# Patient Record
Sex: FEMALE | Race: OTHER | HISPANIC OR LATINO | URBAN - METROPOLITAN AREA
[De-identification: names, ages, dates, MRNs, and addresses within clinical notes are randomized per-mention and may not be internally consistent; named-entity substitution may affect disease eponyms.]

---

## 2023-11-04 ENCOUNTER — HOSPITAL ENCOUNTER (INPATIENT)
Facility: HOSPITAL | Age: 33
LOS: 3 days | Discharge: HOME | DRG: 392 | End: 2023-11-07
Attending: EMERGENCY MEDICINE | Admitting: INTERNAL MEDICINE

## 2023-11-04 DIAGNOSIS — K57.20 DIVERTICULITIS OF LARGE INTESTINE WITH ABSCESS WITHOUT BLEEDING: Primary | ICD-10-CM

## 2023-11-04 DIAGNOSIS — K57.92 DIVERTICULITIS: ICD-10-CM

## 2023-11-04 PROBLEM — N30.00 ACUTE CYSTITIS: Status: ACTIVE | Noted: 2023-11-04

## 2023-11-04 LAB
ALBUMIN SERPL BCP-MCNC: 4.4 G/DL (ref 3.5–5.2)
ALP SERPL-CCNC: 147 U/L (ref 55–135)
ALT SERPL W/O P-5'-P-CCNC: 135 U/L (ref 10–44)
ANION GAP SERPL CALC-SCNC: 15 MMOL/L (ref 8–16)
AST SERPL-CCNC: 70 U/L (ref 10–40)
B-HCG UR QL: NEGATIVE
BACTERIA #/AREA URNS HPF: ABNORMAL /HPF
BACTERIA #/AREA URNS HPF: ABNORMAL /HPF
BASOPHILS # BLD AUTO: 0.04 K/UL (ref 0–0.2)
BASOPHILS NFR BLD: 0.5 % (ref 0–1.9)
BILIRUB SERPL-MCNC: 0.6 MG/DL (ref 0.1–1)
BILIRUB UR QL STRIP: NEGATIVE
BILIRUB UR QL STRIP: NEGATIVE
BUN SERPL-MCNC: 15 MG/DL (ref 6–20)
CALCIUM SERPL-MCNC: 10 MG/DL (ref 8.7–10.5)
CHLORIDE SERPL-SCNC: 102 MMOL/L (ref 95–110)
CLARITY UR: ABNORMAL
CLARITY UR: ABNORMAL
CO2 SERPL-SCNC: 22 MMOL/L (ref 23–29)
COLOR UR: ABNORMAL
COLOR UR: YELLOW
CREAT SERPL-MCNC: 0.8 MG/DL (ref 0.5–1.4)
DIFFERENTIAL METHOD: NORMAL
EOSINOPHIL # BLD AUTO: 0.1 K/UL (ref 0–0.5)
EOSINOPHIL NFR BLD: 1.6 % (ref 0–8)
ERYTHROCYTE [DISTWIDTH] IN BLOOD BY AUTOMATED COUNT: 13.1 % (ref 11.5–14.5)
EST. GFR  (NO RACE VARIABLE): >60 ML/MIN/1.73 M^2
GLUCOSE SERPL-MCNC: 97 MG/DL (ref 70–110)
GLUCOSE UR QL STRIP: NEGATIVE
GLUCOSE UR QL STRIP: NEGATIVE
HCT VFR BLD AUTO: 44 % (ref 37–48.5)
HCV AB SERPL QL IA: NEGATIVE
HEP C VIRUS HOLD SPECIMEN: NORMAL
HGB BLD-MCNC: 14.7 G/DL (ref 12–16)
HGB UR QL STRIP: ABNORMAL
HGB UR QL STRIP: NEGATIVE
HIV 1+2 AB+HIV1 P24 AG SERPL QL IA: NEGATIVE
HYALINE CASTS #/AREA URNS LPF: 0 /LPF
HYALINE CASTS #/AREA URNS LPF: 0 /LPF
IMM GRANULOCYTES # BLD AUTO: 0.03 K/UL (ref 0–0.04)
IMM GRANULOCYTES NFR BLD AUTO: 0.4 % (ref 0–0.5)
KETONES UR QL STRIP: NEGATIVE
KETONES UR QL STRIP: NEGATIVE
LEUKOCYTE ESTERASE UR QL STRIP: ABNORMAL
LEUKOCYTE ESTERASE UR QL STRIP: ABNORMAL
LYMPHOCYTES # BLD AUTO: 2.3 K/UL (ref 1–4.8)
LYMPHOCYTES NFR BLD: 28 % (ref 18–48)
MCH RBC QN AUTO: 28.6 PG (ref 27–31)
MCHC RBC AUTO-ENTMCNC: 33.4 G/DL (ref 32–36)
MCV RBC AUTO: 86 FL (ref 82–98)
MICROSCOPIC COMMENT: ABNORMAL
MICROSCOPIC COMMENT: ABNORMAL
MONOCYTES # BLD AUTO: 0.6 K/UL (ref 0.3–1)
MONOCYTES NFR BLD: 6.8 % (ref 4–15)
NEUTROPHILS # BLD AUTO: 5.2 K/UL (ref 1.8–7.7)
NEUTROPHILS NFR BLD: 62.7 % (ref 38–73)
NITRITE UR QL STRIP: NEGATIVE
NITRITE UR QL STRIP: NEGATIVE
NRBC BLD-RTO: 0 /100 WBC
PH UR STRIP: 6 [PH] (ref 5–8)
PH UR STRIP: 6 [PH] (ref 5–8)
PLATELET # BLD AUTO: 263 K/UL (ref 150–450)
PMV BLD AUTO: 9.8 FL (ref 9.2–12.9)
POTASSIUM SERPL-SCNC: 3.9 MMOL/L (ref 3.5–5.1)
PROT SERPL-MCNC: 9.1 G/DL (ref 6–8.4)
PROT UR QL STRIP: ABNORMAL
PROT UR QL STRIP: ABNORMAL
RBC # BLD AUTO: 5.14 M/UL (ref 4–5.4)
RBC #/AREA URNS HPF: 2 /HPF (ref 0–4)
RBC #/AREA URNS HPF: 3 /HPF (ref 0–4)
SODIUM SERPL-SCNC: 139 MMOL/L (ref 136–145)
SP GR UR STRIP: 1.03 (ref 1–1.03)
SP GR UR STRIP: >1.03 (ref 1–1.03)
SQUAMOUS #/AREA URNS HPF: 24 /HPF
SQUAMOUS #/AREA URNS HPF: 64 /HPF
URN SPEC COLLECT METH UR: ABNORMAL
URN SPEC COLLECT METH UR: ABNORMAL
UROBILINOGEN UR STRIP-ACNC: NEGATIVE EU/DL
UROBILINOGEN UR STRIP-ACNC: NEGATIVE EU/DL
WBC # BLD AUTO: 8.22 K/UL (ref 3.9–12.7)
WBC #/AREA URNS HPF: 14 /HPF (ref 0–5)
WBC #/AREA URNS HPF: 3 /HPF (ref 0–5)
WBC CLUMPS URNS QL MICRO: ABNORMAL
WBC CLUMPS URNS QL MICRO: ABNORMAL

## 2023-11-04 PROCEDURE — 87086 URINE CULTURE/COLONY COUNT: CPT | Performed by: EMERGENCY MEDICINE

## 2023-11-04 PROCEDURE — 81025 URINE PREGNANCY TEST: CPT | Performed by: EMERGENCY MEDICINE

## 2023-11-04 PROCEDURE — 96375 TX/PRO/DX INJ NEW DRUG ADDON: CPT

## 2023-11-04 PROCEDURE — 99285 EMERGENCY DEPT VISIT HI MDM: CPT | Mod: 25

## 2023-11-04 PROCEDURE — 96367 TX/PROPH/DG ADDL SEQ IV INF: CPT

## 2023-11-04 PROCEDURE — 86803 HEPATITIS C AB TEST: CPT | Performed by: EMERGENCY MEDICINE

## 2023-11-04 PROCEDURE — 63600175 PHARM REV CODE 636 W HCPCS: Performed by: EMERGENCY MEDICINE

## 2023-11-04 PROCEDURE — 96365 THER/PROPH/DIAG IV INF INIT: CPT

## 2023-11-04 PROCEDURE — 99223 PR INITIAL HOSPITAL CARE,LEVL III: ICD-10-PCS | Mod: ,,, | Performed by: SURGERY

## 2023-11-04 PROCEDURE — 25000003 PHARM REV CODE 250: Performed by: INTERNAL MEDICINE

## 2023-11-04 PROCEDURE — 63600175 PHARM REV CODE 636 W HCPCS: Performed by: INTERNAL MEDICINE

## 2023-11-04 PROCEDURE — 87389 HIV-1 AG W/HIV-1&-2 AB AG IA: CPT | Performed by: EMERGENCY MEDICINE

## 2023-11-04 PROCEDURE — 85025 COMPLETE CBC W/AUTO DIFF WBC: CPT | Performed by: EMERGENCY MEDICINE

## 2023-11-04 PROCEDURE — 81000 URINALYSIS NONAUTO W/SCOPE: CPT | Performed by: EMERGENCY MEDICINE

## 2023-11-04 PROCEDURE — 99223 1ST HOSP IP/OBS HIGH 75: CPT | Mod: ,,, | Performed by: SURGERY

## 2023-11-04 PROCEDURE — 11000001 HC ACUTE MED/SURG PRIVATE ROOM

## 2023-11-04 PROCEDURE — S5010 5% DEXTROSE AND 0.45% SALINE: HCPCS | Performed by: INTERNAL MEDICINE

## 2023-11-04 PROCEDURE — 80053 COMPREHEN METABOLIC PANEL: CPT | Performed by: EMERGENCY MEDICINE

## 2023-11-04 RX ORDER — CIPROFLOXACIN 2 MG/ML
400 INJECTION, SOLUTION INTRAVENOUS
Status: DISCONTINUED | OUTPATIENT
Start: 2023-11-04 | End: 2023-11-06

## 2023-11-04 RX ORDER — ACETAMINOPHEN 325 MG/1
650 TABLET ORAL EVERY 8 HOURS PRN
Status: DISCONTINUED | OUTPATIENT
Start: 2023-11-04 | End: 2023-11-07 | Stop reason: HOSPADM

## 2023-11-04 RX ORDER — DEXTROSE MONOHYDRATE AND SODIUM CHLORIDE 5; .45 G/100ML; G/100ML
INJECTION, SOLUTION INTRAVENOUS CONTINUOUS
Status: DISCONTINUED | OUTPATIENT
Start: 2023-11-04 | End: 2023-11-07 | Stop reason: HOSPADM

## 2023-11-04 RX ORDER — MORPHINE SULFATE 4 MG/ML
4 INJECTION, SOLUTION INTRAMUSCULAR; INTRAVENOUS EVERY 4 HOURS PRN
Status: DISCONTINUED | OUTPATIENT
Start: 2023-11-04 | End: 2023-11-07 | Stop reason: HOSPADM

## 2023-11-04 RX ORDER — ONDANSETRON 2 MG/ML
4 INJECTION INTRAMUSCULAR; INTRAVENOUS EVERY 6 HOURS PRN
Status: DISCONTINUED | OUTPATIENT
Start: 2023-11-04 | End: 2023-11-07 | Stop reason: HOSPADM

## 2023-11-04 RX ORDER — MORPHINE SULFATE 4 MG/ML
2 INJECTION, SOLUTION INTRAMUSCULAR; INTRAVENOUS EVERY 4 HOURS PRN
Status: DISCONTINUED | OUTPATIENT
Start: 2023-11-04 | End: 2023-11-07 | Stop reason: HOSPADM

## 2023-11-04 RX ORDER — METRONIDAZOLE 500 MG/100ML
500 INJECTION, SOLUTION INTRAVENOUS
Status: DISCONTINUED | OUTPATIENT
Start: 2023-11-04 | End: 2023-11-06

## 2023-11-04 RX ORDER — SODIUM CHLORIDE 0.9 % (FLUSH) 0.9 %
10 SYRINGE (ML) INJECTION
Status: DISCONTINUED | OUTPATIENT
Start: 2023-11-04 | End: 2023-11-07 | Stop reason: HOSPADM

## 2023-11-04 RX ORDER — KETOROLAC TROMETHAMINE 30 MG/ML
30 INJECTION, SOLUTION INTRAMUSCULAR; INTRAVENOUS
Status: COMPLETED | OUTPATIENT
Start: 2023-11-04 | End: 2023-11-04

## 2023-11-04 RX ADMIN — KETOROLAC TROMETHAMINE 30 MG: 30 INJECTION, SOLUTION INTRAMUSCULAR; INTRAVENOUS at 03:11

## 2023-11-04 RX ADMIN — SODIUM CHLORIDE 1000 ML: 9 INJECTION, SOLUTION INTRAVENOUS at 07:11

## 2023-11-04 RX ADMIN — DEXTROSE AND SODIUM CHLORIDE: 5; 450 INJECTION, SOLUTION INTRAVENOUS at 07:11

## 2023-11-04 RX ADMIN — METRONIDAZOLE 500 MG: 5 INJECTION, SOLUTION INTRAVENOUS at 11:11

## 2023-11-04 RX ADMIN — METRONIDAZOLE 500 MG: 5 INJECTION, SOLUTION INTRAVENOUS at 03:11

## 2023-11-04 RX ADMIN — MORPHINE SULFATE 2 MG: 4 INJECTION INTRAVENOUS at 11:11

## 2023-11-04 RX ADMIN — CIPROFLOXACIN 400 MG: 2 INJECTION, SOLUTION INTRAVENOUS at 04:11

## 2023-11-04 NOTE — ED NOTES
In and out catheterization performed using sterile technique. Pt tolerated well. Urine Specimen obtained.

## 2023-11-04 NOTE — ASSESSMENT & PLAN NOTE
Acute diverticulitis with a small 1.5 cm abscess.    Recommend admission to hospital Medicine   Bowel rest  IV fluids  IV antibiotics   Conversion to oral antibiotics if patient pain improves   If the patient's pain worsens, her white count becomes elevated or she develops fevers and chills then a repeat CT scan to see if she has developed an abdominal abscess.  If she develops an abdominal abscess then CT-guided drainage.  If her condition serous you verses then exploratory laparotomy colon resection likely end colostomy.  The colostomy would reversible in the future.      This was discussed with the patient using a .      The patient should be evaluated by case management as she will need outpatient follow-up with either colorectal surgery or Gastroenterology for her diverticulitis.      Patient should be evaluated for any financial assistance that could be provided to her.

## 2023-11-04 NOTE — SUBJECTIVE & OBJECTIVE
No current facility-administered medications on file prior to encounter.     No current outpatient medications on file prior to encounter.       Review of patient's allergies indicates:  No Known Allergies    No past medical history on file.  No past surgical history on file.  Family History    None       Tobacco Use    Smoking status: Not on file    Smokeless tobacco: Not on file   Substance and Sexual Activity    Alcohol use: Not on file    Drug use: Not on file    Sexual activity: Not on file     Review of Systems   Constitutional:  Negative for appetite change, chills, fatigue, fever and unexpected weight change.   HENT:  Negative for hearing loss and rhinorrhea.    Eyes:  Negative for visual disturbance.   Respiratory:  Negative for apnea, cough, shortness of breath and wheezing.    Cardiovascular:  Negative for chest pain and palpitations.   Gastrointestinal:  Positive for abdominal pain. Negative for abdominal distention, blood in stool, constipation, diarrhea, nausea and vomiting.   Genitourinary:  Negative for dysuria, frequency and urgency.   Musculoskeletal:  Negative for arthralgias and neck pain.   Skin:  Negative for rash.   Neurological:  Negative for seizures, weakness, numbness and headaches.   Hematological:  Negative for adenopathy. Does not bruise/bleed easily.   Psychiatric/Behavioral:  Negative for hallucinations. The patient is not nervous/anxious.      Objective:     Vital Signs (Most Recent):  Temp: 98 °F (36.7 °C) (11/04/23 1309)  Pulse: 97 (11/04/23 1700)  Resp: 15 (11/04/23 1700)  BP: 119/83 (11/04/23 1700)  SpO2: 99 % (11/04/23 1700) Vital Signs (24h Range):  Temp:  [98 °F (36.7 °C)] 98 °F (36.7 °C)  Pulse:  [95-97] 97  Resp:  [15-20] 15  SpO2:  [98 %-100 %] 99 %  BP: (119-122)/(74-83) 119/83     Weight: 74.6 kg (164 lb 5.7 oz)  There is no height or weight on file to calculate BMI.     Physical Exam  Vitals and nursing note reviewed.   Constitutional:       Appearance: She is  well-developed.      Comments: Overweight   HENT:      Head: Normocephalic.   Eyes:      Pupils: Pupils are equal, round, and reactive to light.   Neck:      Thyroid: No thyromegaly.      Vascular: No JVD.      Trachea: No tracheal deviation.   Cardiovascular:      Rate and Rhythm: Normal rate and regular rhythm.      Heart sounds: Normal heart sounds.   Pulmonary:      Breath sounds: Normal breath sounds. No wheezing.   Abdominal:      General: Bowel sounds are normal. There is no distension.      Palpations: Abdomen is soft. Abdomen is not rigid. There is no mass.      Tenderness: There is no abdominal tenderness (Right lower quadrant to palpation and percussion). There is no guarding or rebound.      Comments: Somewhat obese   Vertical lower midline incision   Musculoskeletal:         General: Normal range of motion.   Lymphadenopathy:      Cervical: No cervical adenopathy.   Skin:     General: Skin is warm and dry.      Findings: No erythema or rash.   Neurological:      Mental Status: She is oriented to person, place, and time.            I have reviewed all pertinent lab results within the past 24 hours.  CBC:   Recent Labs   Lab 11/04/23  1529   WBC 8.22   RBC 5.14   HGB 14.7   HCT 44.0      MCV 86   MCH 28.6   MCHC 33.4     BMP:   Recent Labs   Lab 11/04/23  1529   GLU 97      K 3.9      CO2 22*   BUN 15   CREATININE 0.8   CALCIUM 10.0     CMP:   Recent Labs   Lab 11/04/23  1529   GLU 97   CALCIUM 10.0   ALBUMIN 4.4   PROT 9.1*      K 3.9   CO2 22*      BUN 15   CREATININE 0.8   ALKPHOS 147*   *   AST 70*   BILITOT 0.6       Significant Diagnostics:  I have reviewed all pertinent imaging results/findings within the past 24 hours.  CT scan    Reading Physician Reading Date Result Priority   MIRELLA Kimbrough Sr., MD  [phone] 11/4/2023 STAT     Narrative & Impression  EXAMINATION:  CT ABDOMEN PELVIS WITHOUT CONTRAST     CLINICAL HISTORY:  LLQ abdominal pain;      TECHNIQUE:  Standard abdomen and pelvis CT protocol without oral or IV contrast was performed     COMPARISON:  None     FINDINGS:  Finding: The size of the heart is within normal limits. The lungs are clear. There is no pneumothorax or pleural effusion.     There is diffuse fatty infiltration of the liver.  The gallbladder, pancreas, spleen, adrenals, and kidneys are normal in appearance. The ureters and the urinary bladder are normal in appearance.  There is a T-type intrauterine device in place.  The appendix is normal in appearance.  There are diverticula in the descending portion of the colon.  Anterior to the distal aspect of the descending colon is a mild amount of inflammatory changes with a small 15 mm abscess.  There is no significant amount of fluid within its abscess.  There is no free fluid within the abdomen or pelvis. There is no pneumoperitoneum.  There is a small fat filled umbilical hernia.  The width of the mouth of this hernia measures 9 mm.     Impression:     1. There are diverticula in the descending portion of the colon.  Anterior to the distal aspect of the descending colon is a mild amount of inflammatory changes with a small 15 mm abscess. There is no significant amount of fluid within its abscess.  This is characteristic of acute diverticulitis.  2. There is a small fat filled umbilical hernia.  The width of the mouth of this hernia measures 9 mm.  3. The above findings and impressions were discussed with Dr. Win via the telephone at 15:05 on 11/04/2023.  I observed these findings at 15:03 on 11/04/2023.  All CT scans at this facility use dose modulation, iterative reconstruction, and/or weight base dosing when appropriate to reduce radiation dose when appropriate to reduce radiation dose to as low as reasonably achievable.        Electronically signed by: Sp Kimbrough MD  Date:                                            11/04/2023  Time:                                            15:08

## 2023-11-04 NOTE — HOSPITAL COURSE
Patient was evaluated in the emergency room.  A CT scan without contrast showed diverticulitis with a 1.5 cm abscess.  Surgery was asked to evaluate the patient.      The patient is currently resting comfortably    11/05/2023.  Patient reports some improvement and abdominal pain.  Will continue NPO    11/6/2023: Pain continues to improve, minimal at this point.  No nausea and vomiting.  We will convert to oral antibiotics and clear liquid diet.    11/7/2023: Pain minimal to none, tolerating regular diet without nausea and vomiting. Stable for discharge on oral antibiotics and with outpatient surgical follow-up to plan colonoscopy in 6 weeks

## 2023-11-04 NOTE — ASSESSMENT & PLAN NOTE
-CT abd :CT scan of the abdomen revealed the presence of diverticula in the descending portion of the colon. Additionally, there was evidence of mild inflammatory changes and a small 15 mm abscess anterior to the distal aspect of the descending colon, characteristic of acute diverticulitis.  -NPO  -IVF  -IVAB  -Pain control PRN

## 2023-11-04 NOTE — ED PROVIDER NOTES
SCRIBE #1 NOTE: I, Juwan Trinidad, am scribing for, and in the presence of, Yeni Win MD. I have scribed the entire note.       History     Chief Complaint   Patient presents with    Abdominal Pain     Left lower quadrant pain x 3 days, + pain with urination, denies nausea or vomiting.     Review of patient's allergies indicates:  No Known Allergies      History of Present Illness     The history is limited by a language barrier. A  was used.       11/4/2023, 3:19 PM  History obtained from the patient      History of Present Illness: Anthony Ruvalcaba is a 32 y.o. female patient who presents to the Emergency Department for evaluation of LLQ abd pain which onset gradually over the past 3 days. Symptoms are constant and moderate in severity. Pt reports she cannot sleep well due to pain and that pain is exacerbated by palpation. Pt reports pain is mitigated by walking or laying down. Associated sxs include dysuria. Patient denies any nausea, vomiting, hematuria, abnormal discharge, and all other sxs at this time. No prior Tx reported. Pt reported she has an IUD. No further complaints or concerns at this time.       Arrival mode: Personal vehicle    PCP: No primary care provider on file.        Past Medical History:  No past medical history on file.    Past Surgical History:  No past surgical history on file.      Family History:  No family history on file.    Social History:  Social History     Tobacco Use    Smoking status: Not on file    Smokeless tobacco: Not on file   Substance and Sexual Activity    Alcohol use: Not on file    Drug use: Not on file    Sexual activity: Not on file        Review of Systems     Review of Systems   Gastrointestinal:  Positive for abdominal pain (LLQ). Negative for nausea and vomiting.   Genitourinary:  Positive for dysuria. Negative for hematuria and vaginal discharge.      Physical Exam     Initial Vitals [11/04/23 1309]   BP Pulse Resp Temp SpO2    122/78 97 20 98 °F (36.7 °C) 100 %      MAP       --          Physical Exam  Nursing Notes and Vital Signs Reviewed.  Constitutional: Patient is in no acute distress. Well-developed and well-nourished.  Head: Atraumatic. Normocephalic.  Eyes: PERRL. EOM intact. Conjunctivae are not pale. No scleral icterus.  ENT: Mucous membranes are moist. Oropharynx is clear and symmetric.    Neck: Supple. Full ROM. No lymphadenopathy.  Cardiovascular: Regular rate. Regular rhythm. No murmurs, rubs, or gallops. Distal pulses are 2+ and symmetric.  Pulmonary/Chest: No respiratory distress. Clear to auscultation bilaterally. No wheezing or rales.  Abdominal: Soft and non-distended.  LLQ tenderness.  No rebound, guarding, or rigidity. Good bowel sounds.  Genitourinary: No CVA tenderness  Musculoskeletal: Moves all extremities. No obvious deformities. No edema. No calf tenderness.  Skin: Warm and dry.  Neurological:  Alert, awake, and appropriate.  Normal speech.  No acute focal neurological deficits are appreciated.  Psychiatric: Normal affect. Good eye contact. Appropriate in content.     ED Course   Procedures  ED Vital Signs:  Vitals:    11/04/23 1309 11/04/23 1630   BP: 122/78 120/74   Pulse: 97 95   Resp: 20 16   Temp: 98 °F (36.7 °C)    TempSrc: Oral    SpO2: 100% 98%   Weight: 74.6 kg (164 lb 5.7 oz)        Abnormal Lab Results:  Labs Reviewed   URINALYSIS, REFLEX TO URINE CULTURE - Abnormal; Notable for the following components:       Result Value    Appearance, UA Hazy (*)     Specific Gravity, UA >1.030 (*)     Protein, UA 1+ (*)     Leukocytes, UA 2+ (*)     All other components within normal limits    Narrative:     Specimen Source->Urine   URINALYSIS MICROSCOPIC - Abnormal; Notable for the following components:    WBC Clumps, UA Occasional (*)     All other components within normal limits    Narrative:     Specimen Source->Urine   COMPREHENSIVE METABOLIC PANEL - Abnormal; Notable for the following components:    CO2 22  (*)     Total Protein 9.1 (*)     Alkaline Phosphatase 147 (*)     AST 70 (*)      (*)     All other components within normal limits   URINALYSIS, REFLEX TO URINE CULTURE - Abnormal; Notable for the following components:    Color, UA Orange (*)     Appearance, UA Hazy (*)     Protein, UA 1+ (*)     Occult Blood UA 1+ (*)     Leukocytes, UA 3+ (*)     All other components within normal limits    Narrative:     Specimen Source->Urine   URINALYSIS MICROSCOPIC - Abnormal; Notable for the following components:    WBC, UA 14 (*)     WBC Clumps, UA Many (*)     All other components within normal limits    Narrative:     Specimen Source->Urine   CULTURE, URINE   HIV 1 / 2 ANTIBODY    Narrative:     Release to patient->Immediate   HEP C VIRUS HOLD SPECIMEN    Narrative:     Release to patient->Immediate   PREGNANCY TEST, URINE RAPID    Narrative:     Specimen Source->Urine   CBC W/ AUTO DIFFERENTIAL   HEPATITIS C ANTIBODY        All Lab Results:  Results for orders placed or performed during the hospital encounter of 11/04/23   HIV 1/2 Ag/Ab (4th Gen)   Result Value Ref Range    HIV 1/2 Ag/Ab Negative Negative   HCV Virus Hold Specimen   Result Value Ref Range    HEP C Virus Hold Specimen Hold for HCV sendout    Urinalysis, Reflex to Urine Culture Urine, Clean Catch    Specimen: Urine   Result Value Ref Range    Specimen UA Urine, Clean Catch     Color, UA Yellow Yellow, Straw, Gracie    Appearance, UA Hazy (A) Clear    pH, UA 6.0 5.0 - 8.0    Specific Gravity, UA >1.030 (A) 1.005 - 1.030    Protein, UA 1+ (A) Negative    Glucose, UA Negative Negative    Ketones, UA Negative Negative    Bilirubin (UA) Negative Negative    Occult Blood UA Negative Negative    Nitrite, UA Negative Negative    Urobilinogen, UA Negative <2.0 EU/dL    Leukocytes, UA 2+ (A) Negative   Pregnancy, urine rapid (UPT)   Result Value Ref Range    Preg Test, Ur Negative    Urinalysis Microscopic   Result Value Ref Range    RBC, UA 3 0 - 4 /hpf     WBC, UA 3 0 - 5 /hpf    WBC Clumps, UA Occasional (A) None-Rare    Bacteria Rare None-Occ /hpf    Squam Epithel, UA 24 /hpf    Hyaline Casts, UA 0 0-1/lpf /lpf    Microscopic Comment SEE COMMENT    CBC auto differential   Result Value Ref Range    WBC 8.22 3.90 - 12.70 K/uL    RBC 5.14 4.00 - 5.40 M/uL    Hemoglobin 14.7 12.0 - 16.0 g/dL    Hematocrit 44.0 37.0 - 48.5 %    MCV 86 82 - 98 fL    MCH 28.6 27.0 - 31.0 pg    MCHC 33.4 32.0 - 36.0 g/dL    RDW 13.1 11.5 - 14.5 %    Platelets 263 150 - 450 K/uL    MPV 9.8 9.2 - 12.9 fL    Immature Granulocytes 0.4 0.0 - 0.5 %    Gran # (ANC) 5.2 1.8 - 7.7 K/uL    Immature Grans (Abs) 0.03 0.00 - 0.04 K/uL    Lymph # 2.3 1.0 - 4.8 K/uL    Mono # 0.6 0.3 - 1.0 K/uL    Eos # 0.1 0.0 - 0.5 K/uL    Baso # 0.04 0.00 - 0.20 K/uL    nRBC 0 0 /100 WBC    Gran % 62.7 38.0 - 73.0 %    Lymph % 28.0 18.0 - 48.0 %    Mono % 6.8 4.0 - 15.0 %    Eosinophil % 1.6 0.0 - 8.0 %    Basophil % 0.5 0.0 - 1.9 %    Differential Method Automated    Comprehensive metabolic panel   Result Value Ref Range    Sodium 139 136 - 145 mmol/L    Potassium 3.9 3.5 - 5.1 mmol/L    Chloride 102 95 - 110 mmol/L    CO2 22 (L) 23 - 29 mmol/L    Glucose 97 70 - 110 mg/dL    BUN 15 6 - 20 mg/dL    Creatinine 0.8 0.5 - 1.4 mg/dL    Calcium 10.0 8.7 - 10.5 mg/dL    Total Protein 9.1 (H) 6.0 - 8.4 g/dL    Albumin 4.4 3.5 - 5.2 g/dL    Total Bilirubin 0.6 0.1 - 1.0 mg/dL    Alkaline Phosphatase 147 (H) 55 - 135 U/L    AST 70 (H) 10 - 40 U/L     (H) 10 - 44 U/L    eGFR >60 >60 mL/min/1.73 m^2    Anion Gap 15 8 - 16 mmol/L   Urinalysis, Reflex to Urine Culture Urine, Clean Catch    Specimen: Urine   Result Value Ref Range    Specimen UA Urine, Clean Catch     Color, UA Orange (A) Yellow, Straw, Gracie    Appearance, UA Hazy (A) Clear    pH, UA 6.0 5.0 - 8.0    Specific Gravity, UA 1.030 1.005 - 1.030    Protein, UA 1+ (A) Negative    Glucose, UA Negative Negative    Ketones, UA Negative Negative    Bilirubin (UA)  Negative Negative    Occult Blood UA 1+ (A) Negative    Nitrite, UA Negative Negative    Urobilinogen, UA Negative <2.0 EU/dL    Leukocytes, UA 3+ (A) Negative   Urinalysis Microscopic   Result Value Ref Range    RBC, UA 2 0 - 4 /hpf    WBC, UA 14 (H) 0 - 5 /hpf    WBC Clumps, UA Many (A) None-Rare    Bacteria Rare None-Occ /hpf    Squam Epithel, UA 64 /hpf    Hyaline Casts, UA 0 0-1/lpf /lpf    Microscopic Comment SEE COMMENT         Imaging Results:  Imaging Results              CT Abdomen Pelvis  Without Contrast (Final result)  Result time 11/04/23 15:08:53      Final result by MIRELLA Kimbrough Sr., MD (11/04/23 15:08:53)                   Impression:      1. There are diverticula in the descending portion of the colon.  Anterior to the distal aspect of the descending colon is a mild amount of inflammatory changes with a small 15 mm abscess. There is no significant amount of fluid within its abscess.  This is characteristic of acute diverticulitis.  2. There is a small fat filled umbilical hernia.  The width of the mouth of this hernia measures 9 mm.  3. The above findings and impressions were discussed with Dr. Win via the telephone at 15:05 on 11/04/2023.  I observed these findings at 15:03 on 11/04/2023.  All CT scans at this facility use dose modulation, iterative reconstruction, and/or weight base dosing when appropriate to reduce radiation dose when appropriate to reduce radiation dose to as low as reasonably achievable.      Electronically signed by: Sp Kimbrough MD  Date:    11/04/2023  Time:    15:08               Narrative:    EXAMINATION:  CT ABDOMEN PELVIS WITHOUT CONTRAST    CLINICAL HISTORY:  LLQ abdominal pain;    TECHNIQUE:  Standard abdomen and pelvis CT protocol without oral or IV contrast was performed    COMPARISON:  None    FINDINGS:  Finding: The size of the heart is within normal limits. The lungs are clear. There is no pneumothorax or pleural effusion.    There is diffuse fatty  infiltration of the liver.  The gallbladder, pancreas, spleen, adrenals, and kidneys are normal in appearance. The ureters and the urinary bladder are normal in appearance.  There is a T-type intrauterine device in place.  The appendix is normal in appearance.  There are diverticula in the descending portion of the colon.  Anterior to the distal aspect of the descending colon is a mild amount of inflammatory changes with a small 15 mm abscess.  There is no significant amount of fluid within its abscess.  There is no free fluid within the abdomen or pelvis. There is no pneumoperitoneum.  There is a small fat filled umbilical hernia.  The width of the mouth of this hernia measures 9 mm.                                              The Emergency Provider reviewed the vital signs and test results, which are outlined above.     ED Discussion     4:23 PM: Discussed pt's case with Dr. Andujar (General Surgery) who recommends to see if Hospital Medicine would admit her for bowel rest and IV antibiotics, and he will see her in consultation.       4:24 PM: Discussed case with Dr. Primo Mercer ([facility]). Dr. Mercer agrees with current care and management of pt and accepts admission.   Admitting Service: [facility]  Admitting Physician: Dr. Mercer  Admit to: Inpatient     4:25 PM: Re-evaluated pt. I have discussed test results, shared treatment plan, and the need for admission with patient and family at bedside. Pt and family express understanding at this time and agree with all information. All questions answered. Pt and family have no further questions or concerns at this time. Pt is ready for admit.        Medical Decision Making  Kidney Stone  UTI  Diverticulitis  TOA    33 y/o  speaking female no significant pmhx, presents with 3 day hx of worsening left lower quadrant pain and dysuria, no other symptoms or complaints, significant tenderness on exam in left lower quad.  Lab work otherwise  normal, CT scan obtained and shows concerns for acute diverticulitis with small abscess, iv antibiotics ordered, surgical consult appreciated.       Amount and/or Complexity of Data Reviewed  Labs: ordered. Decision-making details documented in ED Course.  Radiology: ordered. Decision-making details documented in ED Course.    Risk  Prescription drug management.  Decision regarding hospitalization.                ED Medication(s):  Medications   metronidazole IVPB 500 mg (0 mg Intravenous Stopped 11/4/23 1632)   ciprofloxacin (CIPRO)400mg/200ml D5W IVPB 400 mg (400 mg Intravenous New Bag 11/4/23 1632)   ketorolac injection 30 mg (30 mg Intravenous Given 11/4/23 1532)       New Prescriptions    No medications on file               Scribe Attestation:   Scribe #1: I performed the above scribed service and the documentation accurately describes the services I performed. I attest to the accuracy of the note.     Attending:   Physician Attestation Statement for Scribe #1: I, Yeni Win MD, personally performed the services described in this documentation, as scribed by Juwan Trinidad, in my presence, and it is both accurate and complete.           Clinical Impression       ICD-10-CM ICD-9-CM   1. Diverticulitis of large intestine with abscess without bleeding  K57.20 562.11     569.5   2. Diverticulitis  K57.92 562.11       Disposition:   Disposition: Admitted  Condition: Fair        Yeni Win MD  11/04/23 8179

## 2023-11-04 NOTE — CONSULTS
[facility] - Emergency Dept.  General Surgery  Consult Note    Patient Name: Anthony Ruvalcaba  MRN: [id number]  Code Status: Full Code  Admission Date: 11/4/2023  Hospital Length of Stay: 0 days  Attending Physician: Primo Kennedy,*  Primary Care Provider: No primary care provider on file.    Patient information was obtained from patient and ER records.     Inpatient consult to General surgery  Consult performed by: Ebenezer Andujar MD  Consult ordered by: Yeni Win MD  Reason for consult: Diverticulitis  Assessment/Recommendations: Diverticulitis with a small 1.5 cm abscess recommend admission to Medicine   IV fluids  Bowel rest  IV antibiotics with conversion to oral antibiotics if the patient's condition improved, which is inspected.      If the patient's condition worsens repeat abdominal CT scan with oral and IV contrast.      Surgery, Louis's procedure, only if medical or non operative management fail.        Subjective:     Principal Problem: <principal problem not specified>    History of Present Illness: In the emergency room with a three-day history of left lower quadrant pain.  The pain was sharp in nature.  It was slightly better at rest but worse with movement or pushing on the area.  Was not associated with fever chills.  She reports no significant changes of bowel habits.      Patient was Kazakh speaking.  A  was available.      No current facility-administered medications on file prior to encounter.     No current outpatient medications on file prior to encounter.       Review of patient's allergies indicates:  No Known Allergies    No past medical history on file.  No past surgical history on file.  Family History    None       Tobacco Use    Smoking status: Not on file    Smokeless tobacco: Not on file   Substance and Sexual Activity    Alcohol use: Not on file    Drug use: Not on file    Sexual activity: Not on file     Review of Systems    Constitutional:  Negative for appetite change, chills, fatigue, fever and unexpected weight change.   HENT:  Negative for hearing loss and rhinorrhea.    Eyes:  Negative for visual disturbance.   Respiratory:  Negative for apnea, cough, shortness of breath and wheezing.    Cardiovascular:  Negative for chest pain and palpitations.   Gastrointestinal:  Positive for abdominal pain. Negative for abdominal distention, blood in stool, constipation, diarrhea, nausea and vomiting.   Genitourinary:  Negative for dysuria, frequency and urgency.   Musculoskeletal:  Negative for arthralgias and neck pain.   Skin:  Negative for rash.   Neurological:  Negative for seizures, weakness, numbness and headaches.   Hematological:  Negative for adenopathy. Does not bruise/bleed easily.   Psychiatric/Behavioral:  Negative for hallucinations. The patient is not nervous/anxious.      Objective:     Vital Signs (Most Recent):  Temp: 98 °F (36.7 °C) (11/04/23 1309)  Pulse: 97 (11/04/23 1700)  Resp: 15 (11/04/23 1700)  BP: 119/83 (11/04/23 1700)  SpO2: 99 % (11/04/23 1700) Vital Signs (24h Range):  Temp:  [98 °F (36.7 °C)] 98 °F (36.7 °C)  Pulse:  [95-97] 97  Resp:  [15-20] 15  SpO2:  [98 %-100 %] 99 %  BP: (119-122)/(74-83) 119/83     Weight: 74.6 kg (164 lb 5.7 oz)  There is no height or weight on file to calculate BMI.     Physical Exam  Vitals and nursing note reviewed.   Constitutional:       Appearance: She is well-developed.      Comments: Overweight   HENT:      Head: Normocephalic.   Eyes:      Pupils: Pupils are equal, round, and reactive to light.   Neck:      Thyroid: No thyromegaly.      Vascular: No JVD.      Trachea: No tracheal deviation.   Cardiovascular:      Rate and Rhythm: Normal rate and regular rhythm.      Heart sounds: Normal heart sounds.   Pulmonary:      Breath sounds: Normal breath sounds. No wheezing.   Abdominal:      General: Bowel sounds are normal. There is no distension.      Palpations: Abdomen is soft.  Abdomen is not rigid. There is no mass.      Tenderness: There is no abdominal tenderness (Right lower quadrant to palpation and percussion). There is no guarding or rebound.      Comments: Somewhat obese   Vertical lower midline incision   Musculoskeletal:         General: Normal range of motion.   Lymphadenopathy:      Cervical: No cervical adenopathy.   Skin:     General: Skin is warm and dry.      Findings: No erythema or rash.   Neurological:      Mental Status: She is oriented to person, place, and time.            I have reviewed all pertinent lab results within the past 24 hours.  CBC:   Recent Labs   Lab 11/04/23  1529   WBC 8.22   RBC 5.14   HGB 14.7   HCT 44.0      MCV 86   MCH 28.6   MCHC 33.4     BMP:   Recent Labs   Lab 11/04/23  1529   GLU 97      K 3.9      CO2 22*   BUN 15   CREATININE 0.8   CALCIUM 10.0     CMP:   Recent Labs   Lab 11/04/23  1529   GLU 97   CALCIUM 10.0   ALBUMIN 4.4   PROT 9.1*      K 3.9   CO2 22*      BUN 15   CREATININE 0.8   ALKPHOS 147*   *   AST 70*   BILITOT 0.6       Significant Diagnostics:  I have reviewed all pertinent imaging results/findings within the past 24 hours.  CT scan    Reading Physician Reading Date Result Priority   MIRELLA Kimbrough Sr., MD  [phone] 11/4/2023 STAT     Narrative & Impression  EXAMINATION:  CT ABDOMEN PELVIS WITHOUT CONTRAST     CLINICAL HISTORY:  LLQ abdominal pain;     TECHNIQUE:  Standard abdomen and pelvis CT protocol without oral or IV contrast was performed     COMPARISON:  None     FINDINGS:  Finding: The size of the heart is within normal limits. The lungs are clear. There is no pneumothorax or pleural effusion.     There is diffuse fatty infiltration of the liver.  The gallbladder, pancreas, spleen, adrenals, and kidneys are normal in appearance. The ureters and the urinary bladder are normal in appearance.  There is a T-type intrauterine device in place.  The appendix is normal in  appearance.  There are diverticula in the descending portion of the colon.  Anterior to the distal aspect of the descending colon is a mild amount of inflammatory changes with a small 15 mm abscess.  There is no significant amount of fluid within its abscess.  There is no free fluid within the abdomen or pelvis. There is no pneumoperitoneum.  There is a small fat filled umbilical hernia.  The width of the mouth of this hernia measures 9 mm.     Impression:     1. There are diverticula in the descending portion of the colon.  Anterior to the distal aspect of the descending colon is a mild amount of inflammatory changes with a small 15 mm abscess. There is no significant amount of fluid within its abscess.  This is characteristic of acute diverticulitis.  2. There is a small fat filled umbilical hernia.  The width of the mouth of this hernia measures 9 mm.  3. The above findings and impressions were discussed with Dr. Win via the telephone at 15:05 on 11/04/2023.  I observed these findings at 15:03 on 11/04/2023.  All CT scans at this facility use dose modulation, iterative reconstruction, and/or weight base dosing when appropriate to reduce radiation dose when appropriate to reduce radiation dose to as low as reasonably achievable.        Electronically signed by: Sp Kimbrough MD  Date:                                            11/04/2023  Time:                                           15:08    Assessment/Plan:     Acute cystitis  Per hospital med    Acute diverticulitis  Acute diverticulitis with a small 1.5 cm abscess.    Recommend admission to hospital Medicine   Bowel rest  IV fluids  IV antibiotics   Conversion to oral antibiotics if patient pain improves   If the patient's pain worsens, her white count becomes elevated or she develops fevers and chills then a repeat CT scan to see if she has developed an abdominal abscess.  If she develops an abdominal abscess then CT-guided drainage.  If her  condition serous you verses then exploratory laparotomy colon resection likely end colostomy.  The colostomy would reversible in the future.      This was discussed with the patient using a .      The patient should be evaluated by case management as she will need outpatient follow-up with either colorectal surgery or Gastroenterology for her diverticulitis.      Patient should be evaluated for any financial assistance that could be provided to her.            VTE Risk Mitigation (From admission, onward)         Ordered     IP VTE LOW RISK PATIENT  Once         11/04/23 1702     Place sequential compression device  Until discontinued         11/04/23 1702            A  was used    Thank you for your consult. I will follow-up with patient. Please contact us if you have any additional questions.    Ebenezer Andujar MD  General Surgery  [facility] - Emergency Dept.

## 2023-11-04 NOTE — HPI
In the emergency room with a three-day history of left lower quadrant pain.  The pain was sharp in nature.  It was slightly better at rest but worse with movement or pushing on the area.  Was not associated with fever chills.  She reports no significant changes of bowel habits.      Patient was Bengali speaking.  A  was available.

## 2023-11-04 NOTE — SUBJECTIVE & OBJECTIVE
No past medical history on file.    No past surgical history on file.    Review of patient's allergies indicates:  No Known Allergies    No current facility-administered medications on file prior to encounter.     No current outpatient medications on file prior to encounter.     Family History    None       Tobacco Use    Smoking status: Not on file    Smokeless tobacco: Not on file   Substance and Sexual Activity    Alcohol use: Not on file    Drug use: Not on file    Sexual activity: Not on file     Review of Systems   Constitutional:  Positive for activity change and fatigue.   Gastrointestinal:  Positive for abdominal pain.   All other systems reviewed and are negative.    Objective:     Vital Signs (Most Recent):  Temp: 98 °F (36.7 °C) (11/04/23 1309)  Pulse: 95 (11/04/23 1630)  Resp: 16 (11/04/23 1630)  BP: 120/74 (11/04/23 1630)  SpO2: 98 % (11/04/23 1630) Vital Signs (24h Range):  Temp:  [98 °F (36.7 °C)] 98 °F (36.7 °C)  Pulse:  [95-97] 95  Resp:  [16-20] 16  SpO2:  [98 %-100 %] 98 %  BP: (120-122)/(74-78) 120/74     Weight: 74.6 kg (164 lb 5.7 oz)  There is no height or weight on file to calculate BMI.     Physical Exam  Constitutional:       Appearance: She is obese. She is ill-appearing.   HENT:      Head: Normocephalic and atraumatic.   Eyes:      Extraocular Movements: Extraocular movements intact.      Pupils: Pupils are equal, round, and reactive to light.   Cardiovascular:      Rate and Rhythm: Normal rate and regular rhythm.      Pulses: Normal pulses.      Heart sounds: No murmur heard.  Pulmonary:      Effort: Pulmonary effort is normal. No respiratory distress.      Breath sounds: No stridor. No wheezing or rhonchi.   Abdominal:      Tenderness: There is abdominal tenderness. There is rebound. There is no guarding.   Musculoskeletal:         General: No swelling or tenderness. Normal range of motion.      Cervical back: Normal range of motion.      Right lower leg: No edema.      Left lower leg:  No edema.   Skin:     General: Skin is warm.   Neurological:      General: No focal deficit present.      Mental Status: She is alert and oriented to person, place, and time.      Cranial Nerves: No cranial nerve deficit.      Sensory: No sensory deficit.      Motor: No weakness.      Coordination: Coordination normal.              CRANIAL NERVES     CN III, IV, VI   Pupils are equal, round, and reactive to light.       Significant Labs: All pertinent labs within the past 24 hours have been reviewed.  Recent Lab Results         11/04/23  1551   11/04/23  1529   11/04/23  1339        Albumin   4.4         ALP   147         ALT   135         Anion Gap   15         Appearance, UA Hazy     Hazy       AST   70         Bacteria, UA Rare     Rare       Baso #   0.04         Basophil %   0.5         Bilirubin (UA) Negative     Negative       BILIRUBIN TOTAL   0.6  Comment: For infants and newborns, interpretation of results should be based  on gestational age, weight and in agreement with clinical  observations.    Premature Infant recommended reference ranges:  Up to 24 hours.............<8.0 mg/dL  Up to 48 hours............<12.0 mg/dL  3-5 days..................<15.0 mg/dL  6-29 days.................<15.0 mg/dL           BUN   15         Calcium   10.0         Chloride   102         CO2   22         Color, UA Orange     Yellow       Creatinine   0.8         Differential Method   Automated         eGFR   >60         Eos #   0.1         Eosinophil %   1.6         Glucose   97         Glucose, UA Negative     Negative       Gran # (ANC)   5.2         Gran %   62.7         Hematocrit   44.0         Hemoglobin   14.7         HEP C Virus Hold Specimen   Hold for HCV sendout         HIV 1/2 Ag/Ab   Negative         Hyaline Casts, UA 0     0       Immature Grans (Abs)   0.03  Comment: Mild elevation in immature granulocytes is non specific and   can be seen in a variety of conditions including stress response,   acute  inflammation, trauma and pregnancy. Correlation with other   laboratory and clinical findings is essential.           Immature Granulocytes   0.4         Ketones, UA Negative     Negative       Leukocytes, UA 3+     2+       Lymph #   2.3         Lymph %   28.0         MCH   28.6         MCHC   33.4         MCV   86         Microscopic Comment SEE COMMENT  Comment: Other formed elements not mentioned in the report are not   present in the microscopic examination.        SEE COMMENT  Comment: Other formed elements not mentioned in the report are not   present in the microscopic examination.          Mono #   0.6         Mono %   6.8         MPV   9.8         NITRITE UA Negative     Negative       nRBC   0         Occult Blood UA 1+     Negative       pH, UA 6.0     6.0       Platelet Count   263         Potassium   3.9         Preg Test, Ur     Negative       PROTEIN TOTAL   9.1         Protein, UA 1+  Comment: Recommend a 24 hour urine protein or a urine   protein/creatinine ratio if globulin induced proteinuria is  clinically suspected.       1+  Comment: Recommend a 24 hour urine protein or a urine   protein/creatinine ratio if globulin induced proteinuria is  clinically suspected.         RBC   5.14         RBC, UA 2     3       RDW   13.1         Sodium   139         Specific Gravity, UA 1.030     >1.030       Specimen UA Urine, Clean Catch     Urine, Clean Catch       Squam Epithel, UA 64     24       UROBILINOGEN UA Negative     Negative       WBC Clumps, UA Many     Occasional       WBC, UA 14     3       WBC   8.22                 Significant Imaging: I have reviewed all pertinent imaging results/findings within the past 24 hours.

## 2023-11-04 NOTE — H&P
[facility] - Emergency Dept.  [facility] Medicine  History & Physical    Patient Name: Anthony Ruvalcaba  MRN: [id number]  Patient Class: IP- Inpatient  Admission Date: 2023  Attending Physician: Primo Dow MD  Primary Care Provider: No primary care provider on file.         Patient information was obtained from patient and ER records.     Subjective:     Principal Problem:<principal problem not specified>    Chief Complaint:   Chief Complaint   Patient presents with    Abdominal Pain     Left lower quadrant pain x 3 days, + pain with urination, denies nausea or vomiting.        HPI: 32-year-old Latin female with no significant medical history presented to the emergency room (ER) with complaints of severe left lower quadrant (LLQ) pain persisting for the last three days, which had progressively worsened. She described the pain as intermittent and sharp, rating it at 10/10 in intensity. The pain did not radiate and was not associated with significant additional symptoms. The pain was alleviated when lying flat but exacerbated with movement. The patient denied any history of trauma, fever, chills, nausea, vomiting, diarrhea, constipation, or genitourinary symptoms. She had been in her usual state of health until three days ago and had a history of a previous .    During her ER course, a CT scan of the abdomen revealed the presence of diverticula in the descending portion of the colon. Additionally, there was evidence of mild inflammatory changes and a small 15 mm abscess anterior to the distal aspect of the descending colon, characteristic of acute diverticulitis. White blood cell count (WBC) and hemoglobin/hematocrit (H/H) were within normal limits. A urinalysis was consistent with a urinary tract infection (UTI).    The patient received intravenous (IV) Cipro 400 mg, IV metronidazole 500 mg, and IV Toradol 30 mg as part of her treatment plan in the ER.    ER VS :  BP Pulse Resp Temp                         SpO2  122/78 97 20 98 °F (36.7 °C) 100 %    CODE STATUS : Full CODE  Pt will admitted to inpt with a dx of complicated diverticulitis and UTI      Past medical history : None  Past surgical history : C -section   Past family H/o reviewed non pertinent     Review of patient's allergies indicates:  No Known Allergies    No current facility-administered medications on file prior to encounter.     No current outpatient medications on file prior to encounter.     Family History    None       Tobacco Use    Smoking status: Not on file    Smokeless tobacco: Not on file   Substance and Sexual Activity    Alcohol use: Not on file    Drug use: Not on file    Sexual activity: Not on file     Review of Systems   Constitutional:  Positive for activity change and fatigue.   Gastrointestinal:  Positive for abdominal pain.   All other systems reviewed and are negative.    Objective:     Vital Signs (Most Recent):  Temp: 98 °F (36.7 °C) (11/04/23 1309)  Pulse: 95 (11/04/23 1630)  Resp: 16 (11/04/23 1630)  BP: 120/74 (11/04/23 1630)  SpO2: 98 % (11/04/23 1630) Vital Signs (24h Range):  Temp:  [98 °F (36.7 °C)] 98 °F (36.7 °C)  Pulse:  [95-97] 95  Resp:  [16-20] 16  SpO2:  [98 %-100 %] 98 %  BP: (120-122)/(74-78) 120/74     Weight: 74.6 kg (164 lb 5.7 oz)  There is no height or weight on file to calculate BMI.     Physical Exam  Constitutional:       Appearance: She is obese. She is ill-appearing.   HENT:      Head: Normocephalic and atraumatic.   Eyes:      Extraocular Movements: Extraocular movements intact.      Pupils: Pupils are equal, round, and reactive to light.   Cardiovascular:      Rate and Rhythm: Normal rate and regular rhythm.      Pulses: Normal pulses.      Heart sounds: No murmur heard.  Pulmonary:      Effort: Pulmonary effort is normal. No respiratory distress.      Breath sounds: No stridor. No wheezing or rhonchi.   Abdominal:      Tenderness: There is abdominal tenderness. There is rebound. There is  no guarding.   Musculoskeletal:         General: No swelling or tenderness. Normal range of motion.      Cervical back: Normal range of motion.      Right lower leg: No edema.      Left lower leg: No edema.   Skin:     General: Skin is warm.   Neurological:      General: No focal deficit present.      Mental Status: She is alert and oriented to person, place, and time.      Cranial Nerves: No cranial nerve deficit.      Sensory: No sensory deficit.      Motor: No weakness.      Coordination: Coordination normal.              CRANIAL NERVES     CN III, IV, VI   Pupils are equal, round, and reactive to light.       Significant Labs: All pertinent labs within the past 24 hours have been reviewed.  Recent Lab Results         11/04/23  1551   11/04/23  1529   11/04/23  1339        Albumin   4.4         ALP   147         ALT   135         Anion Gap   15         Appearance, UA Hazy     Hazy       AST   70         Bacteria, UA Rare     Rare       Baso #   0.04         Basophil %   0.5         Bilirubin (UA) Negative     Negative       BILIRUBIN TOTAL   0.6  Comment: For infants and newborns, interpretation of results should be based  on gestational age, weight and in agreement with clinical  observations.    Premature Infant recommended reference ranges:  Up to 24 hours.............<8.0 mg/dL  Up to 48 hours............<12.0 mg/dL  3-5 days..................<15.0 mg/dL  6-29 days.................<15.0 mg/dL           BUN   15         Calcium   10.0         Chloride   102         CO2   22         Color, UA Orange     Yellow       Creatinine   0.8         Differential Method   Automated         eGFR   >60         Eos #   0.1         Eosinophil %   1.6         Glucose   97         Glucose, UA Negative     Negative       Gran # (ANC)   5.2         Gran %   62.7         Hematocrit   44.0         Hemoglobin   14.7         HEP C Virus Hold Specimen   Hold for HCV sendout         HIV 1/2 Ag/Ab   Negative         Hyaline Casts, UA  0     0       Immature Grans (Abs)   0.03  Comment: Mild elevation in immature granulocytes is non specific and   can be seen in a variety of conditions including stress response,   acute inflammation, trauma and pregnancy. Correlation with other   laboratory and clinical findings is essential.           Immature Granulocytes   0.4         Ketones, UA Negative     Negative       Leukocytes, UA 3+     2+       Lymph #   2.3         Lymph %   28.0         MCH   28.6         MCHC   33.4         MCV   86         Microscopic Comment SEE COMMENT  Comment: Other formed elements not mentioned in the report are not   present in the microscopic examination.        SEE COMMENT  Comment: Other formed elements not mentioned in the report are not   present in the microscopic examination.          Mono #   0.6         Mono %   6.8         MPV   9.8         NITRITE UA Negative     Negative       nRBC   0         Occult Blood UA 1+     Negative       pH, UA 6.0     6.0       Platelet Count   263         Potassium   3.9         Preg Test, Ur     Negative       PROTEIN TOTAL   9.1         Protein, UA 1+  Comment: Recommend a 24 hour urine protein or a urine   protein/creatinine ratio if globulin induced proteinuria is  clinically suspected.       1+  Comment: Recommend a 24 hour urine protein or a urine   protein/creatinine ratio if globulin induced proteinuria is  clinically suspected.         RBC   5.14         RBC, UA 2     3       RDW   13.1         Sodium   139         Specific Gravity, UA 1.030     >1.030       Specimen UA Urine, Clean Catch     Urine, Clean Catch       Squam Epithel, UA 64     24       UROBILINOGEN UA Negative     Negative       WBC Clumps, UA Many     Occasional       WBC, UA 14     3       WBC   8.22                 Significant Imaging: I have reviewed all pertinent imaging results/findings within the past 24 hours.    Assessment/Plan:     Acute cystitis  IVAB  F/U urine cx      Acute diverticulitis  -CT abd  :CT scan of the abdomen revealed the presence of diverticula in the descending portion of the colon. Additionally, there was evidence of mild inflammatory changes and a small 15 mm abscess anterior to the distal aspect of the descending colon, characteristic of acute diverticulitis.  -NPO  -IVF  -IVAB  -Pain control PRN         VTE Risk Mitigation (From admission, onward)           Ordered     IP VTE LOW RISK PATIENT  Once         11/04/23 1702     Place sequential compression device  Until discontinued         11/04/23 1702                                   Primo Dow MD  Department of Hospital Medicine  [facility] Emergency Dept.    DUE - given  Family history non-contributory to current problem   Pertinent information:

## 2023-11-04 NOTE — Clinical Note
Diagnosis: Diverticulitis [966000]   Future Attending Provider: TAYLER SHIPMAN [[id number]]   Admitting Provider:: TAYLER SHIPMAN [[id number]]

## 2023-11-04 NOTE — HPI
32-year-old Latin female with no significant medical history presented to the emergency room (ER) with complaints of severe left lower quadrant (LLQ) pain persisting for the last three days, which had progressively worsened. She described the pain as intermittent and sharp, rating it at 10/10 in intensity. The pain did not radiate and was not associated with significant additional symptoms. The pain was alleviated when lying flat but exacerbated with movement. The patient denied any history of trauma, fever, chills, nausea, vomiting, diarrhea, constipation, or genitourinary symptoms. She had been in her usual state of health until three days ago and had a history of a previous .    During her ER course, a CT scan of the abdomen revealed the presence of diverticula in the descending portion of the colon. Additionally, there was evidence of mild inflammatory changes and a small 15 mm abscess anterior to the distal aspect of the descending colon, characteristic of acute diverticulitis. White blood cell count (WBC) and hemoglobin/hematocrit (H/H) were within normal limits. A urinalysis was consistent with a urinary tract infection (UTI).    The patient received intravenous (IV) Cipro 400 mg, IV metronidazole 500 mg, and IV Toradol 30 mg as part of her treatment plan in the ER.    ER VS :  BP Pulse Resp Temp                        SpO2  122/78 97 20 98 °F (36.7 °C) 100 %    CODE STATUS : Full CODE  Pt will admitted to inpt with a dx of complicated diverticulitis and UTI

## 2023-11-05 LAB
ALBUMIN SERPL BCP-MCNC: 3.3 G/DL (ref 3.5–5.2)
ALP SERPL-CCNC: 108 U/L (ref 55–135)
ALT SERPL W/O P-5'-P-CCNC: 122 U/L (ref 10–44)
ANION GAP SERPL CALC-SCNC: 12 MMOL/L (ref 8–16)
AST SERPL-CCNC: 81 U/L (ref 10–40)
BASOPHILS # BLD AUTO: 0.03 K/UL (ref 0–0.2)
BASOPHILS NFR BLD: 0.5 % (ref 0–1.9)
BILIRUB SERPL-MCNC: 0.6 MG/DL (ref 0.1–1)
BUN SERPL-MCNC: 13 MG/DL (ref 6–20)
CALCIUM SERPL-MCNC: 8.7 MG/DL (ref 8.7–10.5)
CHLORIDE SERPL-SCNC: 106 MMOL/L (ref 95–110)
CO2 SERPL-SCNC: 22 MMOL/L (ref 23–29)
CREAT SERPL-MCNC: 0.8 MG/DL (ref 0.5–1.4)
DIFFERENTIAL METHOD: ABNORMAL
EOSINOPHIL # BLD AUTO: 0.1 K/UL (ref 0–0.5)
EOSINOPHIL NFR BLD: 2.1 % (ref 0–8)
ERYTHROCYTE [DISTWIDTH] IN BLOOD BY AUTOMATED COUNT: 13.2 % (ref 11.5–14.5)
EST. GFR  (NO RACE VARIABLE): >60 ML/MIN/1.73 M^2
GLUCOSE SERPL-MCNC: 117 MG/DL (ref 70–110)
HCT VFR BLD AUTO: 37.4 % (ref 37–48.5)
HGB BLD-MCNC: 11.9 G/DL (ref 12–16)
IMM GRANULOCYTES # BLD AUTO: 0.02 K/UL (ref 0–0.04)
IMM GRANULOCYTES NFR BLD AUTO: 0.3 % (ref 0–0.5)
LYMPHOCYTES # BLD AUTO: 1.7 K/UL (ref 1–4.8)
LYMPHOCYTES NFR BLD: 27.7 % (ref 18–48)
MAGNESIUM SERPL-MCNC: 2 MG/DL (ref 1.6–2.6)
MCH RBC QN AUTO: 27.9 PG (ref 27–31)
MCHC RBC AUTO-ENTMCNC: 31.8 G/DL (ref 32–36)
MCV RBC AUTO: 88 FL (ref 82–98)
MONOCYTES # BLD AUTO: 0.5 K/UL (ref 0.3–1)
MONOCYTES NFR BLD: 7.4 % (ref 4–15)
NEUTROPHILS # BLD AUTO: 3.8 K/UL (ref 1.8–7.7)
NEUTROPHILS NFR BLD: 62 % (ref 38–73)
NRBC BLD-RTO: 0 /100 WBC
PHOSPHATE SERPL-MCNC: 3.6 MG/DL (ref 2.7–4.5)
PLATELET # BLD AUTO: 256 K/UL (ref 150–450)
PMV BLD AUTO: 10.2 FL (ref 9.2–12.9)
POTASSIUM SERPL-SCNC: 3.7 MMOL/L (ref 3.5–5.1)
PROCALCITONIN SERPL IA-MCNC: 0.06 NG/ML
PROT SERPL-MCNC: 6.9 G/DL (ref 6–8.4)
RBC # BLD AUTO: 4.27 M/UL (ref 4–5.4)
SODIUM SERPL-SCNC: 140 MMOL/L (ref 136–145)
WBC # BLD AUTO: 6.1 K/UL (ref 3.9–12.7)

## 2023-11-05 PROCEDURE — 80053 COMPREHEN METABOLIC PANEL: CPT | Performed by: INTERNAL MEDICINE

## 2023-11-05 PROCEDURE — S5010 5% DEXTROSE AND 0.45% SALINE: HCPCS | Performed by: INTERNAL MEDICINE

## 2023-11-05 PROCEDURE — 84145 PROCALCITONIN (PCT): CPT | Performed by: INTERNAL MEDICINE

## 2023-11-05 PROCEDURE — 11000001 HC ACUTE MED/SURG PRIVATE ROOM

## 2023-11-05 PROCEDURE — 99233 PR SUBSEQUENT HOSPITAL CARE,LEVL III: ICD-10-PCS | Mod: ,,, | Performed by: SURGERY

## 2023-11-05 PROCEDURE — 84100 ASSAY OF PHOSPHORUS: CPT | Performed by: INTERNAL MEDICINE

## 2023-11-05 PROCEDURE — 63600175 PHARM REV CODE 636 W HCPCS: Performed by: EMERGENCY MEDICINE

## 2023-11-05 PROCEDURE — 99233 SBSQ HOSP IP/OBS HIGH 50: CPT | Mod: ,,, | Performed by: SURGERY

## 2023-11-05 PROCEDURE — 83735 ASSAY OF MAGNESIUM: CPT | Performed by: INTERNAL MEDICINE

## 2023-11-05 PROCEDURE — 63600175 PHARM REV CODE 636 W HCPCS: Performed by: INTERNAL MEDICINE

## 2023-11-05 PROCEDURE — 85025 COMPLETE CBC W/AUTO DIFF WBC: CPT | Performed by: INTERNAL MEDICINE

## 2023-11-05 PROCEDURE — 25000003 PHARM REV CODE 250: Performed by: INTERNAL MEDICINE

## 2023-11-05 RX ADMIN — MORPHINE SULFATE 2 MG: 4 INJECTION INTRAVENOUS at 08:11

## 2023-11-05 RX ADMIN — DEXTROSE AND SODIUM CHLORIDE: 5; 450 INJECTION, SOLUTION INTRAVENOUS at 09:11

## 2023-11-05 RX ADMIN — METRONIDAZOLE 500 MG: 5 INJECTION, SOLUTION INTRAVENOUS at 11:11

## 2023-11-05 RX ADMIN — CIPROFLOXACIN 400 MG: 2 INJECTION, SOLUTION INTRAVENOUS at 02:11

## 2023-11-05 RX ADMIN — METRONIDAZOLE 500 MG: 5 INJECTION, SOLUTION INTRAVENOUS at 08:11

## 2023-11-05 RX ADMIN — DEXTROSE AND SODIUM CHLORIDE: 5; 450 INJECTION, SOLUTION INTRAVENOUS at 08:11

## 2023-11-05 RX ADMIN — METRONIDAZOLE 500 MG: 5 INJECTION, SOLUTION INTRAVENOUS at 04:11

## 2023-11-05 NOTE — PLAN OF CARE
[facility]  Initial Discharge Assessment       Primary Care Provider: No, Primary Doctor    Admission Diagnosis: Diverticulitis [K57.92]  Diverticulitis of large intestine with abscess without bleeding [K57.20]    Admission Date: 11/4/2023  Expected Discharge Date: Per Attending     Transition of Care Barriers: Unable to afford medication, Underinsured, Social    Payor: /     No emergency contact information on file.    Discharge Plan A: Home       No Pharmacies Listed    Initial Assessment (most recent)       Adult Discharge Assessment - 11/05/23 1202          Discharge Assessment    Assessment Type Discharge Planning Assessment     Confirmed/corrected address, phone number and insurance Yes     Confirmed Demographics Correct on Facesheet     Source of Information health record     Communicated NUNO with patient/caregiver Date not available/Unable to determine     Reason For Admission diverticulitis     Do you expect to return to your current living situation? Yes     Do you have help at home or someone to help you manage your care at home? No     Home Layout Able to live on 1st floor     Equipment Currently Used at Home none     Readmission within 30 days? No     Patient currently being followed by outpatient case management? No     Do you currently have service(s) that help you manage your care at home? No     Do you take prescription medications? No     Do you have prescription coverage? No     How do you get to doctors appointments? car, drives self     Are you on dialysis? No     Do you take coumadin? No     DME Needed Upon Discharge  none     Transition of Care Barriers Unable to afford medication;Underinsured;Social     Discharge Plan A Home

## 2023-11-05 NOTE — PROGRESS NOTES
[facility] Medicine  Progress Note    Patient Name: Anthony Ruvalcaba  MRN: [id number]  Patient Class: IP- Inpatient   Admission Date: 2023  Length of Stay: 1 days  Attending Physician: Primo Kennedy,*  Primary Care Provider: Kylie, Primary Doctor        Subjective:     Principal Problem:Diverticulitis of large intestine with abscess without bleeding        HPI:  32-year-old Latin female with no significant medical history presented to the emergency room (ER) with complaints of severe left lower quadrant (LLQ) pain persisting for the last three days, which had progressively worsened. She described the pain as intermittent and sharp, rating it at 10/10 in intensity. The pain did not radiate and was not associated with significant additional symptoms. The pain was alleviated when lying flat but exacerbated with movement. The patient denied any history of trauma, fever, chills, nausea, vomiting, diarrhea, constipation, or genitourinary symptoms. She had been in her usual state of health until three days ago and had a history of a previous .    During her ER course, a CT scan of the abdomen revealed the presence of diverticula in the descending portion of the colon. Additionally, there was evidence of mild inflammatory changes and a small 15 mm abscess anterior to the distal aspect of the descending colon, characteristic of acute diverticulitis. White blood cell count (WBC) and hemoglobin/hematocrit (H/H) were within normal limits. A urinalysis was consistent with a urinary tract infection (UTI).    The patient received intravenous (IV) Cipro 400 mg, IV metronidazole 500 mg, and IV Toradol 30 mg as part of her treatment plan in the ER.    ER VS :  BP Pulse Resp Temp                        SpO2  122/78 97 20 98 °F (36.7 °C) 100 %    CODE STATUS : Full CODE  Pt will admitted to inpt with a dx of complicated diverticulitis and UTI      Overview/Hospital Course:  33 y/o latin female  admitted with a dx of acute diverticulitis with small abscess . She report feeling better  but cont with LLQ pain .       Interval History:     Review of Systems   Constitutional:  Positive for activity change and fatigue.   Gastrointestinal:  Positive for abdominal pain.   All other systems reviewed and are negative.    Objective:     Vital Signs (Most Recent):  Temp: 97 °F (36.1 °C) (11/05/23 1113)  Pulse: 74 (11/05/23 1113)  Resp: 18 (11/05/23 1113)  BP: 108/68 (11/05/23 1113)  SpO2: 97 % (11/05/23 1113) Vital Signs (24h Range):  Temp:  [96.4 °F (35.8 °C)-98 °F (36.7 °C)] 97 °F (36.1 °C)  Pulse:  [71-99] 74  Resp:  [15-18] 18  SpO2:  [96 %-100 %] 97 %  BP: ()/(54-83) 108/68     Weight: 76 kg (167 lb 8.8 oz)  Body mass index is 30.65 kg/m².    Intake/Output Summary (Last 24 hours) at 11/5/2023 1356  Last data filed at 11/5/2023 0932  Gross per 24 hour   Intake 100 ml   Output 350 ml   Net -250 ml         Physical Exam  Constitutional:       Appearance: She is obese. She is ill-appearing.   HENT:      Head: Normocephalic and atraumatic.   Eyes:      Extraocular Movements: Extraocular movements intact.      Pupils: Pupils are equal, round, and reactive to light.   Cardiovascular:      Rate and Rhythm: Normal rate and regular rhythm.      Pulses: Normal pulses.      Heart sounds: No murmur heard.  Pulmonary:      Effort: Pulmonary effort is normal. No respiratory distress.      Breath sounds: No stridor. No wheezing or rhonchi.   Abdominal:      Tenderness: There is abdominal tenderness. There is no guarding or rebound.   Musculoskeletal:         General: No swelling or tenderness. Normal range of motion.      Cervical back: Normal range of motion.      Right lower leg: No edema.      Left lower leg: No edema.   Skin:     General: Skin is warm.   Neurological:      General: No focal deficit present.      Mental Status: She is alert and oriented to person, place, and time.      Cranial Nerves: No cranial nerve  deficit.      Sensory: No sensory deficit.      Motor: No weakness.      Coordination: Coordination normal.             Significant Labs: All pertinent labs within the past 24 hours have been reviewed.  Recent Lab Results         11/05/23  0651   11/04/23  1551   11/04/23  1529        Procalcitonin 0.06  Comment: A concentration < 0.25 ng/mL represents a low risk of bacterial   infection.  Procalcitonin may not be accurate among patients with localized   infection, recent trauma or major surgery, immunosuppressed state,   invasive fungal infection, renal dysfunction. Decisions regarding   initiation or continuation of antibiotic therapy should not be based   solely on procalcitonin levels.             Albumin 3.3     4.4            147            135       Anion Gap 12     15       Appearance, UA   Hazy         AST 81     70       Bacteria, UA   Rare         Baso # 0.03     0.04       Basophil % 0.5     0.5       Bilirubin (UA)   Negative         BILIRUBIN TOTAL 0.6  Comment: For infants and newborns, interpretation of results should be based  on gestational age, weight and in agreement with clinical  observations.    Premature Infant recommended reference ranges:  Up to 24 hours.............<8.0 mg/dL  Up to 48 hours............<12.0 mg/dL  3-5 days..................<15.0 mg/dL  6-29 days.................<15.0 mg/dL       0.6  Comment: For infants and newborns, interpretation of results should be based  on gestational age, weight and in agreement with clinical  observations.    Premature Infant recommended reference ranges:  Up to 24 hours.............<8.0 mg/dL  Up to 48 hours............<12.0 mg/dL  3-5 days..................<15.0 mg/dL  6-29 days.................<15.0 mg/dL         BUN 13     15       Calcium 8.7     10.0       Chloride 106     102       CO2 22     22       Color, UA   Orange         Creatinine 0.8     0.8       Differential Method Automated     Automated       eGFR >60     >60        Eos # 0.1     0.1       Eosinophil % 2.1     1.6       Glucose 117     97       Glucose, UA   Negative         Gran # (ANC) 3.8     5.2       Gran % 62.0     62.7       Hematocrit 37.4     44.0       Hemoglobin 11.9     14.7       Hepatitis C Ab     Negative       HEP C Virus Hold Specimen     Hold for HCV sendout       HIV 1/2 Ag/Ab     Negative       Hyaline Casts, UA   0         Immature Grans (Abs) 0.02  Comment: Mild elevation in immature granulocytes is non specific and   can be seen in a variety of conditions including stress response,   acute inflammation, trauma and pregnancy. Correlation with other   laboratory and clinical findings is essential.       0.03  Comment: Mild elevation in immature granulocytes is non specific and   can be seen in a variety of conditions including stress response,   acute inflammation, trauma and pregnancy. Correlation with other   laboratory and clinical findings is essential.         Immature Granulocytes 0.3     0.4       Ketones, UA   Negative         Leukocytes, UA   3+         Lymph # 1.7     2.3       Lymph % 27.7     28.0       Magnesium  2.0           MCH 27.9     28.6       MCHC 31.8     33.4       MCV 88     86       Microscopic Comment   SEE COMMENT  Comment: Other formed elements not mentioned in the report are not   present in the microscopic examination.            Mono # 0.5     0.6       Mono % 7.4     6.8       MPV 10.2     9.8       NITRITE UA   Negative         nRBC 0     0       Occult Blood UA   1+         pH, UA   6.0         Phosphorus Level 3.6           Platelet Count 256     263       Potassium 3.7     3.9       PROTEIN TOTAL 6.9     9.1       Protein, UA   1+  Comment: Recommend a 24 hour urine protein or a urine   protein/creatinine ratio if globulin induced proteinuria is  clinically suspected.           RBC 4.27     5.14       RBC, UA   2         RDW 13.2     13.1       Sodium 140     139       Specific [place], UA   1.030         Specimen UA    Urine, Clean Catch         Squam Epithel, UA   64         UROBILINOGEN UA   Negative         WBC Clumps, UA   Many         WBC, UA   14         WBC 6.10     8.22               Significant Imaging: I have reviewed all pertinent imaging results/findings within the past 24 hours.      Assessment/Plan:      * Diverticulitis of large intestine with abscess without bleeding  -CT abd :CT scan of the abdomen revealed the presence of diverticula in the descending portion of the colon. Additionally, there was evidence of mild inflammatory changes and a small 15 mm abscess anterior to the distal aspect of the descending colon, characteristic of acute diverticulitis.  -NPO  -IVF  -IVAB  -Pain control PRN       Acute cystitis  IVAB  F/U urine cx        VTE Risk Mitigation (From admission, onward)         Ordered     IP VTE LOW RISK PATIENT  Once         11/04/23 1702     Place sequential compression device  Until discontinued         11/04/23 1702                Discharge Planning   NUNO:      Code Status: Full Code   Is the patient medically ready for discharge?:     Reason for patient still in hospital (select all that apply): Treatment  Discharge Plan A: Home                  Primo Dow MD  Department of Hospital Medicine   [facility]

## 2023-11-05 NOTE — PLAN OF CARE
Discussed poc with pt, pt verbalized understanding    Purposeful rounding every 2hours    VS wnl  Fall precautions in place, remains injury free  Pt denies c/o nausea  Pain under control with PRN meds    IVFs  Abx given as prescribed  Bed locked at lowest position  Call light within reach    Chart check complete  Will cont with POC

## 2023-11-05 NOTE — HOSPITAL COURSE
33 y/o latin female admitted with a dx of acute diverticulitis with small abscess . She report feeling better  but cont with LLQ pain.    11/6  NAEON,  used, patient c/o LLE pain, mild, improving. Started on CLD today.    11/7  Advanced to bland diet, tolearing w/o issues, +Bms, no abdominal pain  SW consulted to assist with patient to establish care with PCP and colorectal surgery f/u for outpatient colonoscopy  Plan to complete course of Augmentin outpatient  Stable for hopsital discharge

## 2023-11-05 NOTE — SUBJECTIVE & OBJECTIVE
Interval History:     Review of Systems   Constitutional:  Positive for activity change and fatigue.   Gastrointestinal:  Positive for abdominal pain.   All other systems reviewed and are negative.    Objective:     Vital Signs (Most Recent):  Temp: 97 °F (36.1 °C) (11/05/23 1113)  Pulse: 74 (11/05/23 1113)  Resp: 18 (11/05/23 1113)  BP: 108/68 (11/05/23 1113)  SpO2: 97 % (11/05/23 1113) Vital Signs (24h Range):  Temp:  [96.4 °F (35.8 °C)-98 °F (36.7 °C)] 97 °F (36.1 °C)  Pulse:  [71-99] 74  Resp:  [15-18] 18  SpO2:  [96 %-100 %] 97 %  BP: ()/(54-83) 108/68     Weight: 76 kg (167 lb 8.8 oz)  Body mass index is 30.65 kg/m².    Intake/Output Summary (Last 24 hours) at 11/5/2023 1356  Last data filed at 11/5/2023 0932  Gross per 24 hour   Intake 100 ml   Output 350 ml   Net -250 ml         Physical Exam  Constitutional:       Appearance: She is obese. She is ill-appearing.   HENT:      Head: Normocephalic and atraumatic.   Eyes:      Extraocular Movements: Extraocular movements intact.      Pupils: Pupils are equal, round, and reactive to light.   Cardiovascular:      Rate and Rhythm: Normal rate and regular rhythm.      Pulses: Normal pulses.      Heart sounds: No murmur heard.  Pulmonary:      Effort: Pulmonary effort is normal. No respiratory distress.      Breath sounds: No stridor. No wheezing or rhonchi.   Abdominal:      Tenderness: There is abdominal tenderness. There is no guarding or rebound.   Musculoskeletal:         General: No swelling or tenderness. Normal range of motion.      Cervical back: Normal range of motion.      Right lower leg: No edema.      Left lower leg: No edema.   Skin:     General: Skin is warm.   Neurological:      General: No focal deficit present.      Mental Status: She is alert and oriented to person, place, and time.      Cranial Nerves: No cranial nerve deficit.      Sensory: No sensory deficit.      Motor: No weakness.      Coordination: Coordination normal.              Significant Labs: All pertinent labs within the past 24 hours have been reviewed.  Recent Lab Results         11/05/23  0651   11/04/23  1551   11/04/23  1529        Procalcitonin 0.06  Comment: A concentration < 0.25 ng/mL represents a low risk of bacterial   infection.  Procalcitonin may not be accurate among patients with localized   infection, recent trauma or major surgery, immunosuppressed state,   invasive fungal infection, renal dysfunction. Decisions regarding   initiation or continuation of antibiotic therapy should not be based   solely on procalcitonin levels.             Albumin 3.3     4.4            147            135       Anion Gap 12     15       Appearance, UA   Hazy         AST 81     70       Bacteria, UA   Rare         Baso # 0.03     0.04       Basophil % 0.5     0.5       Bilirubin (UA)   Negative         BILIRUBIN TOTAL 0.6  Comment: For infants and newborns, interpretation of results should be based  on gestational age, weight and in agreement with clinical  observations.    Premature Infant recommended reference ranges:  Up to 24 hours.............<8.0 mg/dL  Up to 48 hours............<12.0 mg/dL  3-5 days..................<15.0 mg/dL  6-29 days.................<15.0 mg/dL       0.6  Comment: For infants and newborns, interpretation of results should be based  on gestational age, weight and in agreement with clinical  observations.    Premature Infant recommended reference ranges:  Up to 24 hours.............<8.0 mg/dL  Up to 48 hours............<12.0 mg/dL  3-5 days..................<15.0 mg/dL  6-29 days.................<15.0 mg/dL         BUN 13     15       Calcium 8.7     10.0       Chloride 106     102       CO2 22     22       Color, UA   Orange         Creatinine 0.8     0.8       Differential Method Automated     Automated       eGFR >60     >60       Eos # 0.1     0.1       Eosinophil % 2.1     1.6       Glucose 117     97       Glucose, UA   Negative          Gran # (ANC) 3.8     5.2       Gran % 62.0     62.7       Hematocrit 37.4     44.0       Hemoglobin 11.9     14.7       Hepatitis C Ab     Negative       HEP C Virus Hold Specimen     Hold for HCV sendout       HIV 1/2 Ag/Ab     Negative       Hyaline Casts, UA   0         Immature Grans (Abs) 0.02  Comment: Mild elevation in immature granulocytes is non specific and   can be seen in a variety of conditions including stress response,   acute inflammation, trauma and pregnancy. Correlation with other   laboratory and clinical findings is essential.       0.03  Comment: Mild elevation in immature granulocytes is non specific and   can be seen in a variety of conditions including stress response,   acute inflammation, trauma and pregnancy. Correlation with other   laboratory and clinical findings is essential.         Immature Granulocytes 0.3     0.4       Ketones, UA   Negative         Leukocytes, UA   3+         Lymph # 1.7     2.3       Lymph % 27.7     28.0       Magnesium  2.0           MCH 27.9     28.6       MCHC 31.8     33.4       MCV 88     86       Microscopic Comment   SEE COMMENT  Comment: Other formed elements not mentioned in the report are not   present in the microscopic examination.            Mono # 0.5     0.6       Mono % 7.4     6.8       MPV 10.2     9.8       NITRITE UA   Negative         nRBC 0     0       Occult Blood UA   1+         pH, UA   6.0         Phosphorus Level 3.6           Platelet Count 256     263       Potassium 3.7     3.9       PROTEIN TOTAL 6.9     9.1       Protein, UA   1+  Comment: Recommend a 24 hour urine protein or a urine   protein/creatinine ratio if globulin induced proteinuria is  clinically suspected.           RBC 4.27     5.14       RBC, UA   2         RDW 13.2     13.1       Sodium 140     139       Specific [place], UA   1.030         Specimen UA   Urine, Clean Catch         Squam Epithel, UA   64         UROBILINOGEN UA   Negative         WBC Clumps, UA    Many         WBC, UA   14         WBC 6.10     8.22               Significant Imaging: I have reviewed all pertinent imaging results/findings within the past 24 hours.   Unna Boot Text: An Unna boot was placed to help immobilize the limb and facilitate more rapid healing.

## 2023-11-05 NOTE — PROGRESS NOTES
[facility]  General Surgery  Progress Note    Subjective:     History of Present Illness:  In the emergency room with a three-day history of left lower quadrant pain.  The pain was sharp in nature.  It was slightly better at rest but worse with movement or pushing on the area.  Was not associated with fever chills.  She reports no significant changes of bowel habits.      Patient was Estonian speaking.  A  was available.      Post-Op Info:  * No surgery found *         Interval History:  Slightly less pain.  Afebrile continue current management    Medications:  Continuous Infusions:   dextrose 5 % and 0.45 % NaCl 125 mL/hr at 11/05/23 0802     Scheduled Meds:   ciprofloxacin (CIPRO)400mg/200ml D5W IVPB  400 mg Intravenous Q12H    metronidazole  500 mg Intravenous Q8H     PRN Meds:acetaminophen, morphine, morphine, ondansetron, sodium chloride 0.9%     Review of patient's allergies indicates:  No Known Allergies  Objective:     Vital Signs (Most Recent):  Temp: 97 °F (36.1 °C) (11/05/23 1113)  Pulse: 74 (11/05/23 1113)  Resp: 18 (11/05/23 1113)  BP: 108/68 (11/05/23 1113)  SpO2: 97 % (11/05/23 1113) Vital Signs (24h Range):  Temp:  [96.4 °F (35.8 °C)-98 °F (36.7 °C)] 97 °F (36.1 °C)  Pulse:  [71-99] 74  Resp:  [15-20] 18  SpO2:  [96 %-100 %] 97 %  BP: ()/(54-83) 108/68     Weight: 76 kg (167 lb 8.8 oz)  Body mass index is 30.65 kg/m².    Intake/Output - Last 3 Shifts         11/03 0700 11/04 0659 11/04 0700 11/05 0659 11/05 0700 11/06 0659    IV Piggyback  100     Total Intake(mL/kg)  100 (1.3)     Urine (mL/kg/hr)   350 (1.1)    Total Output   350    Net  +100 -350                    Physical Exam  Vitals reviewed.   Constitutional:       Appearance: She is well-developed. She is obese.   HENT:      Head: Normocephalic.   Eyes:      Pupils: Pupils are equal, round, and reactive to light.   Neck:      Thyroid: No thyromegaly.      Vascular: No JVD.      Trachea: No tracheal  deviation.   Cardiovascular:      Rate and Rhythm: Normal rate and regular rhythm.      Heart sounds: Normal heart sounds.   Pulmonary:      Breath sounds: Normal breath sounds. No wheezing.   Abdominal:      General: Bowel sounds are normal. There is no distension.      Palpations: Abdomen is soft. Abdomen is not rigid. There is no mass.      Tenderness: There is no abdominal tenderness (mild to moderate left lower quadrant). There is no guarding or rebound.      Comments: Obese, lower midline scar   Musculoskeletal:         General: Normal range of motion.   Lymphadenopathy:      Cervical: No cervical adenopathy.   Skin:     General: Skin is warm and dry.      Findings: No erythema or rash.   Neurological:      Mental Status: She is alert and oriented to person, place, and time.          Significant Labs:  I have reviewed all pertinent lab results within the past 24 hours.  CBC:   Recent Labs   Lab 11/05/23  0651   WBC 6.10   RBC 4.27   HGB 11.9*   HCT 37.4      MCV 88   MCH 27.9   MCHC 31.8*     BMP:   Recent Labs   Lab 11/05/23  0651   *      K 3.7      CO2 22*   BUN 13   CREATININE 0.8   CALCIUM 8.7   MG 2.0       Significant Diagnostics:  I have reviewed all pertinent imaging results/findings within the past 24 hours.  No new    Assessment/Plan:     Acute cystitis  Per hospital med    Diverticulitis of large intestine with abscess without bleeding  Acute diverticulitis with a small 1.5 cm abscess.    Recommend admission to hospital Medicine   Continue bowel rest, if pain is improving tomorrow clear liquids  IV fluids  IV antibiotics   Conversion to oral antibiotics if patient pain improves   If the patient's pain worsens, her white count becomes elevated or she develops fevers and chills then a repeat CT scan to see if she has developed an abdominal abscess.  If she develops an abdominal abscess then CT-guided drainage.  If her condition serous you verses then exploratory laparotomy  colon resection likely end colostomy.  The colostomy would reversible in the future.      This was discussed with the patient using a .      The patient should be evaluated by case management as she will need outpatient follow-up with either colorectal surgery or Gastroenterology for her diverticulitis.      Patient should be evaluated for any financial assistance that could be provided to her.              Ebenezer Andujar MD  General Surgery  [facility]

## 2023-11-05 NOTE — PLAN OF CARE
Patient is in stable condition, no acute distress, remained free from injuries, receiving IV fluids, receiving  IV antibiotics, pain adequately controlled with PRN, VSS, and all active orders reviewed. 24 hr chart check performed.

## 2023-11-05 NOTE — ASSESSMENT & PLAN NOTE
Acute diverticulitis with a small 1.5 cm abscess.    Recommend admission to hospital Medicine   Continue bowel rest, if pain is improving tomorrow clear liquids  IV fluids  IV antibiotics   Conversion to oral antibiotics if patient pain improves   If the patient's pain worsens, her white count becomes elevated or she develops fevers and chills then a repeat CT scan to see if she has developed an abdominal abscess.  If she develops an abdominal abscess then CT-guided drainage.  If her condition serous you verses then exploratory laparotomy colon resection likely end colostomy.  The colostomy would reversible in the future.      This was discussed with the patient using a .      The patient should be evaluated by case management as she will need outpatient follow-up with either colorectal surgery or Gastroenterology for her diverticulitis.      Patient should be evaluated for any financial assistance that could be provided to her.

## 2023-11-06 LAB
ALBUMIN SERPL BCP-MCNC: 3.3 G/DL (ref 3.5–5.2)
ALP SERPL-CCNC: 108 U/L (ref 55–135)
ALT SERPL W/O P-5'-P-CCNC: 146 U/L (ref 10–44)
ANION GAP SERPL CALC-SCNC: 9 MMOL/L (ref 8–16)
AST SERPL-CCNC: 86 U/L (ref 10–40)
BACTERIA UR CULT: NO GROWTH
BASOPHILS # BLD AUTO: 0.03 K/UL (ref 0–0.2)
BASOPHILS NFR BLD: 0.6 % (ref 0–1.9)
BILIRUB SERPL-MCNC: 0.4 MG/DL (ref 0.1–1)
BUN SERPL-MCNC: 8 MG/DL (ref 6–20)
CALCIUM SERPL-MCNC: 8.6 MG/DL (ref 8.7–10.5)
CHLORIDE SERPL-SCNC: 107 MMOL/L (ref 95–110)
CO2 SERPL-SCNC: 24 MMOL/L (ref 23–29)
CREAT SERPL-MCNC: 0.8 MG/DL (ref 0.5–1.4)
DIFFERENTIAL METHOD: NORMAL
EOSINOPHIL # BLD AUTO: 0.1 K/UL (ref 0–0.5)
EOSINOPHIL NFR BLD: 2 % (ref 0–8)
ERYTHROCYTE [DISTWIDTH] IN BLOOD BY AUTOMATED COUNT: 13 % (ref 11.5–14.5)
EST. GFR  (NO RACE VARIABLE): >60 ML/MIN/1.73 M^2
GLUCOSE SERPL-MCNC: 105 MG/DL (ref 70–110)
HCT VFR BLD AUTO: 39.2 % (ref 37–48.5)
HGB BLD-MCNC: 12.6 G/DL (ref 12–16)
IMM GRANULOCYTES # BLD AUTO: 0.02 K/UL (ref 0–0.04)
IMM GRANULOCYTES NFR BLD AUTO: 0.4 % (ref 0–0.5)
LYMPHOCYTES # BLD AUTO: 1.5 K/UL (ref 1–4.8)
LYMPHOCYTES NFR BLD: 27.9 % (ref 18–48)
MCH RBC QN AUTO: 28.4 PG (ref 27–31)
MCHC RBC AUTO-ENTMCNC: 32.1 G/DL (ref 32–36)
MCV RBC AUTO: 89 FL (ref 82–98)
MONOCYTES # BLD AUTO: 0.4 K/UL (ref 0.3–1)
MONOCYTES NFR BLD: 7.4 % (ref 4–15)
NEUTROPHILS # BLD AUTO: 3.3 K/UL (ref 1.8–7.7)
NEUTROPHILS NFR BLD: 61.7 % (ref 38–73)
NRBC BLD-RTO: 0 /100 WBC
PLATELET # BLD AUTO: 238 K/UL (ref 150–450)
PMV BLD AUTO: 9.7 FL (ref 9.2–12.9)
POTASSIUM SERPL-SCNC: 4 MMOL/L (ref 3.5–5.1)
PROT SERPL-MCNC: 7 G/DL (ref 6–8.4)
RBC # BLD AUTO: 4.43 M/UL (ref 4–5.4)
SODIUM SERPL-SCNC: 140 MMOL/L (ref 136–145)
WBC # BLD AUTO: 5.41 K/UL (ref 3.9–12.7)

## 2023-11-06 PROCEDURE — 99232 SBSQ HOSP IP/OBS MODERATE 35: CPT | Mod: ,,,

## 2023-11-06 PROCEDURE — 63600175 PHARM REV CODE 636 W HCPCS: Performed by: EMERGENCY MEDICINE

## 2023-11-06 PROCEDURE — 25000003 PHARM REV CODE 250

## 2023-11-06 PROCEDURE — S5010 5% DEXTROSE AND 0.45% SALINE: HCPCS | Performed by: INTERNAL MEDICINE

## 2023-11-06 PROCEDURE — 85025 COMPLETE CBC W/AUTO DIFF WBC: CPT | Performed by: INTERNAL MEDICINE

## 2023-11-06 PROCEDURE — 99232 PR SUBSEQUENT HOSPITAL CARE,LEVL II: ICD-10-PCS | Mod: ,,,

## 2023-11-06 PROCEDURE — 36415 COLL VENOUS BLD VENIPUNCTURE: CPT | Performed by: INTERNAL MEDICINE

## 2023-11-06 PROCEDURE — 11000001 HC ACUTE MED/SURG PRIVATE ROOM

## 2023-11-06 PROCEDURE — 25000003 PHARM REV CODE 250: Performed by: INTERNAL MEDICINE

## 2023-11-06 PROCEDURE — 80053 COMPREHEN METABOLIC PANEL: CPT | Performed by: INTERNAL MEDICINE

## 2023-11-06 RX ORDER — AMOXICILLIN AND CLAVULANATE POTASSIUM 875; 125 MG/1; MG/1
1 TABLET, FILM COATED ORAL EVERY 12 HOURS
Status: DISCONTINUED | OUTPATIENT
Start: 2023-11-06 | End: 2023-11-07 | Stop reason: HOSPADM

## 2023-11-06 RX ADMIN — DEXTROSE AND SODIUM CHLORIDE: 5; 450 INJECTION, SOLUTION INTRAVENOUS at 05:11

## 2023-11-06 RX ADMIN — CIPROFLOXACIN 400 MG: 2 INJECTION, SOLUTION INTRAVENOUS at 04:11

## 2023-11-06 RX ADMIN — AMOXICILLIN AND CLAVULANATE POTASSIUM 1 TABLET: 875; 125 TABLET, FILM COATED ORAL at 08:11

## 2023-11-06 RX ADMIN — AMOXICILLIN AND CLAVULANATE POTASSIUM 1 TABLET: 875; 125 TABLET, FILM COATED ORAL at 09:11

## 2023-11-06 NOTE — PLAN OF CARE
Discussed plan of care with pt. Pt verbalized understanding. No signs of acute distress. Bed alarm refused with bed at lowest position. IV abx administered per orders. Call light within reach. Purposeful rounding Q2h.      Chart check complete.     Problem: Adult Inpatient Plan of Care  Goal: Plan of Care Review  Outcome: Ongoing, Progressing  Goal: Patient-Specific Goal (Individualized)  Outcome: Ongoing, Progressing  Goal: Absence of Hospital-Acquired Illness or Injury  Outcome: Ongoing, Progressing  Goal: Optimal Comfort and Wellbeing  Outcome: Ongoing, Progressing  Goal: Readiness for Transition of Care  Outcome: Ongoing, Progressing     Problem: Pain Acute  Goal: Acceptable Pain Control and Functional Ability  Outcome: Ongoing, Progressing     Problem: Fall Injury Risk  Goal: Absence of Fall and Fall-Related Injury  Outcome: Ongoing, Progressing

## 2023-11-06 NOTE — PROGRESS NOTES
[facility] Medicine  Progress Note    Patient Name: Anthony Ruvalcaba  MRN: [id number]  Patient Class: IP- Inpatient   Admission Date: 2023  Length of Stay: 2 days  Attending Physician: Eduin Funk MD  Primary Care Provider: Kylie, Primary Doctor        Subjective:     Principal Problem:Diverticulitis of large intestine with abscess without bleeding        HPI:  32-year-old Latin female with no significant medical history presented to the emergency room (ER) with complaints of severe left lower quadrant (LLQ) pain persisting for the last three days, which had progressively worsened. She described the pain as intermittent and sharp, rating it at 10/10 in intensity. The pain did not radiate and was not associated with significant additional symptoms. The pain was alleviated when lying flat but exacerbated with movement. The patient denied any history of trauma, fever, chills, nausea, vomiting, diarrhea, constipation, or genitourinary symptoms. She had been in her usual state of health until three days ago and had a history of a previous .    During her ER course, a CT scan of the abdomen revealed the presence of diverticula in the descending portion of the colon. Additionally, there was evidence of mild inflammatory changes and a small 15 mm abscess anterior to the distal aspect of the descending colon, characteristic of acute diverticulitis. White blood cell count (WBC) and hemoglobin/hematocrit (H/H) were within normal limits. A urinalysis was consistent with a urinary tract infection (UTI).    The patient received intravenous (IV) Cipro 400 mg, IV metronidazole 500 mg, and IV Toradol 30 mg as part of her treatment plan in the ER.    ER VS :  BP Pulse Resp Temp                        SpO2  122/78 97 20 98 °F (36.7 °C) 100 %    CODE STATUS : Full CODE  Pt will admitted to inpt with a dx of complicated diverticulitis and UTI      Overview/Hospital Course:  33 y/o latin female admitted  with a dx of acute diverticulitis with small abscess . She report feeling better  but cont with LLQ pain.    11/6  NAEON,  used, patient c/o LLE pain, mild, improving. Started on CLD today.        Review of Systems   All other systems reviewed and are negative.    Objective:     Vital Signs (Most Recent):  Temp: 98.1 °F (36.7 °C) (11/06/23 0705)  Pulse: 73 (11/06/23 0705)  Resp: 14 (11/06/23 0705)  BP: 104/67 (11/06/23 0705)  SpO2: 99 % (11/06/23 0705) Vital Signs (24h Range):  Temp:  [97.6 °F (36.4 °C)-98.1 °F (36.7 °C)] 98.1 °F (36.7 °C)  Pulse:  [71-76] 73  Resp:  [14-17] 14  SpO2:  [98 %-99 %] 99 %  BP: (100-110)/(57-74) 104/67     Weight: 76 kg (167 lb 8.8 oz)  Body mass index is 30.65 kg/m².    Intake/Output Summary (Last 24 hours) at 11/6/2023 1115  Last data filed at 11/5/2023 1823  Gross per 24 hour   Intake 2031.31 ml   Output 650 ml   Net 1381.31 ml         Physical Exam  Vitals and nursing note reviewed.   Constitutional:       General: She is not in acute distress.     Appearance: Normal appearance. She is obese. She is ill-appearing.   Cardiovascular:      Rate and Rhythm: Normal rate and regular rhythm.      Heart sounds: No murmur heard.  Pulmonary:      Effort: Pulmonary effort is normal. No respiratory distress.      Breath sounds: No wheezing.   Abdominal:      General: There is no distension.      Palpations: Abdomen is soft.      Tenderness: There is abdominal tenderness.   Neurological:      General: No focal deficit present.      Mental Status: She is alert and oriented to person, place, and time.   Psychiatric:         Mood and Affect: Mood normal.         Behavior: Behavior normal.             Significant Labs: All pertinent labs within the past 24 hours have been reviewed.  Recent Lab Results         11/06/23  0639        Albumin 3.3                     Anion Gap 9       AST 86       Baso # 0.03       Basophil % 0.6       BILIRUBIN TOTAL 0.4  Comment: For infants and  newborns, interpretation of results should be based  on gestational age, weight and in agreement with clinical  observations.    Premature Infant recommended reference ranges:  Up to 24 hours.............<8.0 mg/dL  Up to 48 hours............<12.0 mg/dL  3-5 days..................<15.0 mg/dL  6-29 days.................<15.0 mg/dL         BUN 8       Calcium 8.6       Chloride 107       CO2 24       Creatinine 0.8       Differential Method Automated       eGFR >60       Eos # 0.1       Eosinophil % 2.0       Glucose 105       Gran # (ANC) 3.3       Gran % 61.7       Hematocrit 39.2       Hemoglobin 12.6       Immature Grans (Abs) 0.02  Comment: Mild elevation in immature granulocytes is non specific and   can be seen in a variety of conditions including stress response,   acute inflammation, trauma and pregnancy. Correlation with other   laboratory and clinical findings is essential.         Immature Granulocytes 0.4       Lymph # 1.5       Lymph % 27.9       MCH 28.4       MCHC 32.1       MCV 89       Mono # 0.4       Mono % 7.4       MPV 9.7       nRBC 0       Platelet Count 238       Potassium 4.0       PROTEIN TOTAL 7.0       RBC 4.43       RDW 13.0       Sodium 140       WBC 5.41               Significant Imaging: I have reviewed all pertinent imaging results/findings within the past 24 hours.    CT Abdomen Pelvis  Without Contrast   Final Result      1. There are diverticula in the descending portion of the colon.  Anterior to the distal aspect of the descending colon is a mild amount of inflammatory changes with a small 15 mm abscess. There is no significant amount of fluid within its abscess.  This is characteristic of acute diverticulitis.   2. There is a small fat filled umbilical hernia.  The width of the mouth of this hernia measures 9 mm.   3. The above findings and impressions were discussed with Dr. Win via the telephone at 15:05 on 11/04/2023.  I observed these findings at 15:03 on 11/04/2023.    All CT scans at this facility use dose modulation, iterative reconstruction, and/or weight base dosing when appropriate to reduce radiation dose when appropriate to reduce radiation dose to as low as reasonably achievable.         Electronically signed by: Sp Kimbrough MD   Date:    11/04/2023   Time:    15:08              Assessment/Plan:      * Diverticulitis of large intestine with abscess without bleeding  -CT abd :CT scan of the abdomen revealed the presence of diverticula in the descending portion of the colon. Additionally, there was evidence of mild inflammatory changes and a small 15 mm abscess anterior to the distal aspect of the descending colon, characteristic of acute diverticulitis.  -NPO --> CLD today, advance as tolerated  -IVF  -IVAB  -Pain control PRN     Acute cystitis  IVAB  F/U urine cx      VTE Risk Mitigation (From admission, onward)         Ordered     IP VTE LOW RISK PATIENT  Once         11/04/23 1702     Place sequential compression device  Until discontinued         11/04/23 1702                Discharge Planning   NUNO:      Code Status: Full Code   Is the patient medically ready for discharge?:     Reason for patient still in hospital (select all that apply): Patient trending condition, Treatment and Consult recommendations  Discharge Plan A: Home                  Eduin Funk MD  Department of Hospital Medicine   [facility] - Med Surg

## 2023-11-06 NOTE — SUBJECTIVE & OBJECTIVE
Interval History: Pain continues to improve, minimal at this point.  No nausea and vomiting.  We will convert to oral antibiotics and clear liquid diet.    Interpretive services were used to aid in the completion of the evaluation.    Medications:  Continuous Infusions:   dextrose 5 % and 0.45 % NaCl 125 mL/hr at 11/05/23 2101     Scheduled Meds:   amoxicillin-clavulanate 875-125mg  1 tablet Oral Q12H     PRN Meds:acetaminophen, morphine, morphine, ondansetron, sodium chloride 0.9%     Review of patient's allergies indicates:  No Known Allergies  Objective:     Vital Signs (Most Recent):  Temp: 98.1 °F (36.7 °C) (11/06/23 0705)  Pulse: 73 (11/06/23 0705)  Resp: 14 (11/06/23 0705)  BP: 104/67 (11/06/23 0705)  SpO2: 99 % (11/06/23 0705) Vital Signs (24h Range):  Temp:  [97 °F (36.1 °C)-98.1 °F (36.7 °C)] 98.1 °F (36.7 °C)  Pulse:  [71-76] 73  Resp:  [14-18] 14  SpO2:  [97 %-99 %] 99 %  BP: (100-110)/(57-74) 104/67     Weight: 76 kg (167 lb 8.8 oz)  Body mass index is 30.65 kg/m².    Intake/Output - Last 3 Shifts         11/04 0700 11/05 0659 11/05 0700 11/06 0659 11/06 0700 11/07 0659    I.V. (mL/kg)  1292.2 (17)     IV Piggyback 100 739.1     Total Intake(mL/kg) 100 (1.3) 2031.3 (26.7)     Urine (mL/kg/hr)  1000 (0.5)     Total Output  1000     Net +100 +1031.3            Urine Occurrence  2 x              Physical Exam  Vitals and nursing note reviewed.   Constitutional:       General: She is not in acute distress.     Appearance: She is well-developed. She is not ill-appearing.   HENT:      Head: Normocephalic and atraumatic.      Right Ear: External ear normal.      Left Ear: External ear normal.      Nose: Nose normal.      Mouth/Throat:      Mouth: Mucous membranes are moist.   Eyes:      Extraocular Movements: Extraocular movements intact.   Cardiovascular:      Rate and Rhythm: Normal rate and regular rhythm.      Heart sounds: Normal heart sounds.   Pulmonary:      Effort: Pulmonary effort is normal. No  respiratory distress.      Breath sounds: Normal breath sounds. No stridor. No wheezing, rhonchi or rales.   Abdominal:      Comments: Abdomen is soft and nondistended with minimal tenderness to palpation in the left lower quadrant   Musculoskeletal:      Cervical back: Neck supple.   Skin:     General: Skin is warm and dry.   Neurological:      Mental Status: She is alert and oriented to person, place, and time.   Psychiatric:         Behavior: Behavior normal.          Significant Labs:  I have reviewed all pertinent lab results within the past 24 hours.  CBC:   Recent Labs   Lab 11/06/23  0639   WBC 5.41   RBC 4.43   HGB 12.6   HCT 39.2      MCV 89   MCH 28.4   MCHC 32.1     CMP:   Recent Labs   Lab 11/05/23  0651   *   CALCIUM 8.7   ALBUMIN 3.3*   PROT 6.9      K 3.7   CO2 22*      BUN 13   CREATININE 0.8   ALKPHOS 108   *   AST 81*   BILITOT 0.6       Significant Diagnostics:  I have reviewed all pertinent imaging results/findings within the past 24 hours.  No new pertinent imaging

## 2023-11-06 NOTE — ASSESSMENT & PLAN NOTE
-CT abd :CT scan of the abdomen revealed the presence of diverticula in the descending portion of the colon. Additionally, there was evidence of mild inflammatory changes and a small 15 mm abscess anterior to the distal aspect of the descending colon, characteristic of acute diverticulitis.  -NPO --> CLD today, advance as tolerated  -IVF  -IVAB  -Pain control PRN

## 2023-11-06 NOTE — PROGRESS NOTES
[facility] Surg  General Surgery  Progress Note    Subjective:     History of Present Illness:  In the emergency room with a three-day history of left lower quadrant pain.  The pain was sharp in nature.  It was slightly better at rest but worse with movement or pushing on the area.  Was not associated with fever chills.  She reports no significant changes of bowel habits.      Patient was Khmer speaking.  A  was available.      Post-Op Info:  * No surgery found *         Interval History: Pain continues to improve, minimal at this point.  No nausea and vomiting.  We will convert to oral antibiotics and clear liquid diet.    Interpretive services were used to aid in the completion of the evaluation.    Medications:  Continuous Infusions:   dextrose 5 % and 0.45 % NaCl 125 mL/hr at 11/05/23 2101     Scheduled Meds:   amoxicillin-clavulanate 875-125mg  1 tablet Oral Q12H     PRN Meds:acetaminophen, morphine, morphine, ondansetron, sodium chloride 0.9%     Review of patient's allergies indicates:  No Known Allergies  Objective:     Vital Signs (Most Recent):  Temp: 98.1 °F (36.7 °C) (11/06/23 0705)  Pulse: 73 (11/06/23 0705)  Resp: 14 (11/06/23 0705)  BP: 104/67 (11/06/23 0705)  SpO2: 99 % (11/06/23 0705) Vital Signs (24h Range):  Temp:  [97 °F (36.1 °C)-98.1 °F (36.7 °C)] 98.1 °F (36.7 °C)  Pulse:  [71-76] 73  Resp:  [14-18] 14  SpO2:  [97 %-99 %] 99 %  BP: (100-110)/(57-74) 104/67     Weight: 76 kg (167 lb 8.8 oz)  Body mass index is 30.65 kg/m².    Intake/Output - Last 3 Shifts         11/04 0700 11/05 0659 11/05 0700 11/06 0659 11/06 0700 11/07 0659    I.V. (mL/kg)  1292.2 (17)     IV Piggyback 100 739.1     Total Intake(mL/kg) 100 (1.3) 2031.3 (26.7)     Urine (mL/kg/hr)  1000 (0.5)     Total Output  1000     Net +100 +1031.3            Urine Occurrence  2 x              Physical Exam  Vitals and nursing note reviewed.   Constitutional:       General: She is not in acute distress.      Appearance: She is well-developed. She is not ill-appearing.   HENT:      Head: Normocephalic and atraumatic.      Right Ear: External ear normal.      Left Ear: External ear normal.      Nose: Nose normal.      Mouth/Throat:      Mouth: Mucous membranes are moist.   Eyes:      Extraocular Movements: Extraocular movements intact.   Cardiovascular:      Rate and Rhythm: Normal rate and regular rhythm.      Heart sounds: Normal heart sounds.   Pulmonary:      Effort: Pulmonary effort is normal. No respiratory distress.      Breath sounds: Normal breath sounds. No stridor. No wheezing, rhonchi or rales.   Abdominal:      Comments: Abdomen is soft and nondistended with minimal tenderness to palpation in the left lower quadrant   Musculoskeletal:      Cervical back: Neck supple.   Skin:     General: Skin is warm and dry.   Neurological:      Mental Status: She is alert and oriented to person, place, and time.   Psychiatric:         Behavior: Behavior normal.          Significant Labs:  I have reviewed all pertinent lab results within the past 24 hours.  CBC:   Recent Labs   Lab 11/06/23  0639   WBC 5.41   RBC 4.43   HGB 12.6   HCT 39.2      MCV 89   MCH 28.4   MCHC 32.1     CMP:   Recent Labs   Lab 11/05/23  0651   *   CALCIUM 8.7   ALBUMIN 3.3*   PROT 6.9      K 3.7   CO2 22*      BUN 13   CREATININE 0.8   ALKPHOS 108   *   AST 81*   BILITOT 0.6       Significant Diagnostics:  I have reviewed all pertinent imaging results/findings within the past 24 hours.  No new pertinent imaging    Assessment/Plan:     * Diverticulitis of large intestine with abscess without bleeding  Acute diverticulitis with a small 1.5 cm abscess.  Pain improving.      - convert to oral antibiotics.    - advanced to clear liquid diet, monitor for tolerance.  - Continue IVF  - patient will need outpatient follow up for colonoscopy in 6 weeks and for further management of future diverticulitis episodes should they  occur. Social work consulted for assistance as patient has no insurance  - Discussed with patient if she worsens or fails to improve, may require repeat scan or surgical intervention during this admission      Acute cystitis  Per [facility]        Vandana Rubi PA-C  General Surgery  [facility] [facility]

## 2023-11-06 NOTE — ASSESSMENT & PLAN NOTE
Acute diverticulitis with a small 1.5 cm abscess.  Pain improving.      - convert to oral antibiotics.    - advanced to clear liquid diet, monitor for tolerance.  - Continue IVF  - patient will need outpatient follow up for colonoscopy in 6 weeks and for further management of future diverticulitis episodes should they occur. Social work consulted for assistance as patient has no insurance  - Discussed with patient if she worsens or fails to improve, may require repeat scan or surgical intervention during this admission

## 2023-11-06 NOTE — PLAN OF CARE
11/06/23 1350   Discharge Reassessment   Assessment Type Discharge Planning Reassessment   Did the patient's condition or plan change since previous assessment? No   Discharge Plan discussed with:   (Attending MD, Dr. Funk)   Communicated NUNO with patient/caregiver Date not available/Unable to determine   Discharge Plan A Home with family   DME Needed Upon Discharge  none   Transition of Care Barriers Unisured   Why the patient remains in the hospital Requires continued medical care   Post-Acute Status   Discharge Delays None known at this time        Assessment/Plan:      * Diverticulitis of large intestine with abscess without bleeding  -CT abd :CT scan of the abdomen revealed the presence of diverticula in the descending portion of the colon. Additionally, there was evidence of mild inflammatory changes and a small 15 mm abscess anterior to the distal aspect of the descending colon, characteristic of acute diverticulitis.  -NPO --> CLD today, advance as tolerated  -IVF  -IVAB  -Pain control PRN      Acute cystitis  IVAB  F/U urine cx            VTE Risk Mitigation (From admission, onward)           Ordered       IP VTE LOW RISK PATIENT  Once         11/04/23 1702       Place sequential compression device  Until discontinued         11/04/23 1702                    Discharge Planning   NUNO:      Code Status: Full Code   Is the patient medically ready for discharge?:     Reason for patient still in hospital (select all that apply): Patient trending condition, Treatment and Consult recommendations  Discharge Plan A: Home

## 2023-11-06 NOTE — PLAN OF CARE
Pt Awake, Alert, and oriented to Person, Place, Time  , remains free from falls/injuries this shift. Safety precautions maintained. Pain managed with rest. No s/s of acute distress noted. Continue with plan of care. Chart check complete.       Problem: Infection  Goal: Absence of Infection Signs and Symptoms  Outcome: Ongoing, Progressing     Problem: Adult Inpatient Plan of Care  Goal: Plan of Care Review  Outcome: Ongoing, Progressing  Goal: Patient-Specific Goal (Individualized)  Outcome: Ongoing, Progressing  Goal: Absence of Hospital-Acquired Illness or Injury  Outcome: Ongoing, Progressing  Goal: Optimal Comfort and Wellbeing  Outcome: Ongoing, Progressing  Goal: Readiness for Transition of Care  Outcome: Ongoing, Progressing

## 2023-11-06 NOTE — SUBJECTIVE & OBJECTIVE
Review of Systems   All other systems reviewed and are negative.    Objective:     Vital Signs (Most Recent):  Temp: 98.1 °F (36.7 °C) (11/06/23 0705)  Pulse: 73 (11/06/23 0705)  Resp: 14 (11/06/23 0705)  BP: 104/67 (11/06/23 0705)  SpO2: 99 % (11/06/23 0705) Vital Signs (24h Range):  Temp:  [97.6 °F (36.4 °C)-98.1 °F (36.7 °C)] 98.1 °F (36.7 °C)  Pulse:  [71-76] 73  Resp:  [14-17] 14  SpO2:  [98 %-99 %] 99 %  BP: (100-110)/(57-74) 104/67     Weight: 76 kg (167 lb 8.8 oz)  Body mass index is 30.65 kg/m².    Intake/Output Summary (Last 24 hours) at 11/6/2023 1115  Last data filed at 11/5/2023 1823  Gross per 24 hour   Intake 2031.31 ml   Output 650 ml   Net 1381.31 ml         Physical Exam  Vitals and nursing note reviewed.   Constitutional:       General: She is not in acute distress.     Appearance: Normal appearance. She is obese. She is ill-appearing.   Cardiovascular:      Rate and Rhythm: Normal rate and regular rhythm.      Heart sounds: No murmur heard.  Pulmonary:      Effort: Pulmonary effort is normal. No respiratory distress.      Breath sounds: No wheezing.   Abdominal:      General: There is no distension.      Palpations: Abdomen is soft.      Tenderness: There is abdominal tenderness.   Neurological:      General: No focal deficit present.      Mental Status: She is alert and oriented to person, place, and time.   Psychiatric:         Mood and Affect: Mood normal.         Behavior: Behavior normal.             Significant Labs: All pertinent labs within the past 24 hours have been reviewed.  Recent Lab Results         11/06/23  0639        Albumin 3.3                     Anion Gap 9       AST 86       Baso # 0.03       Basophil % 0.6       BILIRUBIN TOTAL 0.4  Comment: For infants and newborns, interpretation of results should be based  on gestational age, weight and in agreement with clinical  observations.    Premature Infant recommended reference ranges:  Up to 24  hours.............<8.0 mg/dL  Up to 48 hours............<12.0 mg/dL  3-5 days..................<15.0 mg/dL  6-29 days.................<15.0 mg/dL         BUN 8       Calcium 8.6       Chloride 107       CO2 24       Creatinine 0.8       Differential Method Automated       eGFR >60       Eos # 0.1       Eosinophil % 2.0       Glucose 105       Gran # (ANC) 3.3       Gran % 61.7       Hematocrit 39.2       Hemoglobin 12.6       Immature Grans (Abs) 0.02  Comment: Mild elevation in immature granulocytes is non specific and   can be seen in a variety of conditions including stress response,   acute inflammation, trauma and pregnancy. Correlation with other   laboratory and clinical findings is essential.         Immature Granulocytes 0.4       Lymph # 1.5       Lymph % 27.9       MCH 28.4       MCHC 32.1       MCV 89       Mono # 0.4       Mono % 7.4       MPV 9.7       nRBC 0       Platelet Count 238       Potassium 4.0       PROTEIN TOTAL 7.0       RBC 4.43       RDW 13.0       Sodium 140       WBC 5.41               Significant Imaging: I have reviewed all pertinent imaging results/findings within the past 24 hours.    CT Abdomen Pelvis  Without Contrast   Final Result      1. There are diverticula in the descending portion of the colon.  Anterior to the distal aspect of the descending colon is a mild amount of inflammatory changes with a small 15 mm abscess. There is no significant amount of fluid within its abscess.  This is characteristic of acute diverticulitis.   2. There is a small fat filled umbilical hernia.  The width of the mouth of this hernia measures 9 mm.   3. The above findings and impressions were discussed with Dr. Win via the telephone at 15:05 on 11/04/2023.  I observed these findings at 15:03 on 11/04/2023.   All CT scans at this facility use dose modulation, iterative reconstruction, and/or weight base dosing when appropriate to reduce radiation dose when appropriate to reduce radiation dose  to as low as reasonably achievable.         Electronically signed by: Sp Kimbrough MD   Date:    11/04/2023   Time:    15:08

## 2023-11-07 VITALS
DIASTOLIC BLOOD PRESSURE: 66 MMHG | BODY MASS INDEX: 30.83 KG/M2 | WEIGHT: 167.56 LBS | TEMPERATURE: 98 F | RESPIRATION RATE: 19 BRPM | HEIGHT: 62 IN | OXYGEN SATURATION: 97 % | HEART RATE: 73 BPM | SYSTOLIC BLOOD PRESSURE: 101 MMHG

## 2023-11-07 LAB
ALBUMIN SERPL BCP-MCNC: 3.4 G/DL (ref 3.5–5.2)
ALP SERPL-CCNC: 112 U/L (ref 55–135)
ALT SERPL W/O P-5'-P-CCNC: 133 U/L (ref 10–44)
ANION GAP SERPL CALC-SCNC: 10 MMOL/L (ref 8–16)
AST SERPL-CCNC: 71 U/L (ref 10–40)
BASOPHILS # BLD AUTO: 0.03 K/UL (ref 0–0.2)
BASOPHILS NFR BLD: 0.5 % (ref 0–1.9)
BILIRUB SERPL-MCNC: 0.5 MG/DL (ref 0.1–1)
BUN SERPL-MCNC: 6 MG/DL (ref 6–20)
CALCIUM SERPL-MCNC: 8.6 MG/DL (ref 8.7–10.5)
CHLORIDE SERPL-SCNC: 107 MMOL/L (ref 95–110)
CO2 SERPL-SCNC: 23 MMOL/L (ref 23–29)
CREAT SERPL-MCNC: 0.7 MG/DL (ref 0.5–1.4)
DIFFERENTIAL METHOD: NORMAL
EOSINOPHIL # BLD AUTO: 0.2 K/UL (ref 0–0.5)
EOSINOPHIL NFR BLD: 2.4 % (ref 0–8)
ERYTHROCYTE [DISTWIDTH] IN BLOOD BY AUTOMATED COUNT: 13.2 % (ref 11.5–14.5)
EST. GFR  (NO RACE VARIABLE): >60 ML/MIN/1.73 M^2
GLUCOSE SERPL-MCNC: 104 MG/DL (ref 70–110)
HCT VFR BLD AUTO: 39.3 % (ref 37–48.5)
HGB BLD-MCNC: 12.9 G/DL (ref 12–16)
IMM GRANULOCYTES # BLD AUTO: 0.01 K/UL (ref 0–0.04)
IMM GRANULOCYTES NFR BLD AUTO: 0.2 % (ref 0–0.5)
LYMPHOCYTES # BLD AUTO: 2.1 K/UL (ref 1–4.8)
LYMPHOCYTES NFR BLD: 32.2 % (ref 18–48)
MCH RBC QN AUTO: 28.5 PG (ref 27–31)
MCHC RBC AUTO-ENTMCNC: 32.8 G/DL (ref 32–36)
MCV RBC AUTO: 87 FL (ref 82–98)
MONOCYTES # BLD AUTO: 0.5 K/UL (ref 0.3–1)
MONOCYTES NFR BLD: 7.9 % (ref 4–15)
NEUTROPHILS # BLD AUTO: 3.8 K/UL (ref 1.8–7.7)
NEUTROPHILS NFR BLD: 56.8 % (ref 38–73)
NRBC BLD-RTO: 0 /100 WBC
PLATELET # BLD AUTO: 242 K/UL (ref 150–450)
PMV BLD AUTO: 9.8 FL (ref 9.2–12.9)
POTASSIUM SERPL-SCNC: 3.4 MMOL/L (ref 3.5–5.1)
PROT SERPL-MCNC: 7.1 G/DL (ref 6–8.4)
RBC # BLD AUTO: 4.52 M/UL (ref 4–5.4)
SODIUM SERPL-SCNC: 140 MMOL/L (ref 136–145)
WBC # BLD AUTO: 6.62 K/UL (ref 3.9–12.7)

## 2023-11-07 PROCEDURE — 36415 COLL VENOUS BLD VENIPUNCTURE: CPT | Performed by: INTERNAL MEDICINE

## 2023-11-07 PROCEDURE — 25000003 PHARM REV CODE 250

## 2023-11-07 PROCEDURE — 25000003 PHARM REV CODE 250: Performed by: HOSPITALIST

## 2023-11-07 PROCEDURE — 85025 COMPLETE CBC W/AUTO DIFF WBC: CPT | Performed by: INTERNAL MEDICINE

## 2023-11-07 PROCEDURE — 80053 COMPREHEN METABOLIC PANEL: CPT | Performed by: INTERNAL MEDICINE

## 2023-11-07 PROCEDURE — 99232 SBSQ HOSP IP/OBS MODERATE 35: CPT | Mod: ,,,

## 2023-11-07 PROCEDURE — 99232 PR SUBSEQUENT HOSPITAL CARE,LEVL II: ICD-10-PCS | Mod: ,,,

## 2023-11-07 RX ORDER — ONDANSETRON 4 MG/1
4 TABLET, ORALLY DISINTEGRATING ORAL 2 TIMES DAILY
Qty: 20 TABLET | Refills: 0 | Status: SHIPPED | OUTPATIENT
Start: 2023-11-07

## 2023-11-07 RX ORDER — POTASSIUM CHLORIDE 20 MEQ/1
40 TABLET, EXTENDED RELEASE ORAL ONCE
Status: COMPLETED | OUTPATIENT
Start: 2023-11-07 | End: 2023-11-07

## 2023-11-07 RX ORDER — AMOXICILLIN AND CLAVULANATE POTASSIUM 875; 125 MG/1; MG/1
1 TABLET, FILM COATED ORAL EVERY 12 HOURS
Qty: 18 TABLET | Refills: 0 | Status: SHIPPED | OUTPATIENT
Start: 2023-11-07 | End: 2023-11-16

## 2023-11-07 RX ADMIN — AMOXICILLIN AND CLAVULANATE POTASSIUM 1 TABLET: 875; 125 TABLET, FILM COATED ORAL at 09:11

## 2023-11-07 RX ADMIN — POTASSIUM CHLORIDE 40 MEQ: 1500 TABLET, EXTENDED RELEASE ORAL at 09:11

## 2023-11-07 NOTE — PLAN OF CARE
[facility] Med Surg  Discharge Final Note    Primary Care Provider: No, Primary Doctor    Expected Discharge Date: 11/7/2023    Final Discharge Note (most recent)       Final Note - 11/07/23 0924          Final Note    Assessment Type Final Discharge Note     Anticipated Discharge Disposition Home or Self Care        Post-Acute Status    Discharge Delays None known at this time                   No needs.

## 2023-11-07 NOTE — PLAN OF CARE
Problem: Adult Inpatient Plan of Care  Goal: Plan of Care Review  Outcome: Ongoing, Progressing     Problem: Adult Inpatient Plan of Care  Goal: Patient-Specific Goal (Individualized)  Outcome: Ongoing, Progressing  Flowsheets (Taken 11/7/2023 0308)  Anxieties, Fears or Concerns:  needed, Macedonian speaking  Individualized Care Needs: light off     Problem: Infection  Goal: Absence of Infection Signs and Symptoms  Outcome: Ongoing, Progressing     Problem: Pain Acute  Goal: Acceptable Pain Control and Functional Ability  Outcome: Ongoing, Progressing     Problem: Fall Injury Risk  Goal: Absence of Fall and Fall-Related Injury  Outcome: Ongoing, Progressing       Discussed POC with pt, verbalized understanding.  Patient remains free from injury. Safety precautions maintained. No s/s of acute distress.  Purposeful rounding every two hours.   Pain controlled per MD order. IVF infusing.   Diet orders continued, pt diet: clear liquid  Vital signs continued per orders this shift.    utilized, patient is Macedonian speaking.  Chart and orders review completed. Pt education about care completed.

## 2023-11-07 NOTE — DISCHARGE SUMMARY
[facility] Medicine  Discharge Summary      Patient Name: Anthony Ruvalcaba  MRN: [id number]  NEVIN: [id number]  Patient Class: IP- Inpatient  Admission Date: 2023  Hospital Length of Stay: 3 days  Discharge Date and Time: No discharge date for patient encounter.  Attending Physician: Eduin Funk MD   Discharging Provider: Eduin Funk MD  Primary Care Provider: Kylie, Primary Doctor    Primary Care Team: Networked reference to record PCT     HPI:   32-year-old Latin female with no significant medical history presented to the emergency room (ER) with complaints of severe left lower quadrant (LLQ) pain persisting for the last three days, which had progressively worsened. She described the pain as intermittent and sharp, rating it at 10/10 in intensity. The pain did not radiate and was not associated with significant additional symptoms. The pain was alleviated when lying flat but exacerbated with movement. The patient denied any history of trauma, fever, chills, nausea, vomiting, diarrhea, constipation, or genitourinary symptoms. She had been in her usual state of health until three days ago and had a history of a previous .    During her ER course, a CT scan of the abdomen revealed the presence of diverticula in the descending portion of the colon. Additionally, there was evidence of mild inflammatory changes and a small 15 mm abscess anterior to the distal aspect of the descending colon, characteristic of acute diverticulitis. White blood cell count (WBC) and hemoglobin/hematocrit (H/H) were within normal limits. A urinalysis was consistent with a urinary tract infection (UTI).    The patient received intravenous (IV) Cipro 400 mg, IV metronidazole 500 mg, and IV Toradol 30 mg as part of her treatment plan in the ER.    ER VS :  BP Pulse Resp Temp                        SpO2  122/78 97 20 98 °F (36.7 °C) 100 %    CODE STATUS : Full CODE  Pt will admitted to inpt with a dx of  complicated diverticulitis and UTI      * No surgery found *      Hospital Course:   33 y/o latin female admitted with a dx of acute diverticulitis with small abscess . She report feeling better  but cont with LLQ pain.    11/6  NAEON,  used, patient c/o LLE pain, mild, improving. Started on CLD today.    11/7  Advanced to bland diet, tolearing w/o issues, +Bms, no abdominal pain  SW consulted to assist with patient to establish care with PCP and colorectal surgery f/u for outpatient colonoscopy  Plan to complete course of Augmentin outpatient  Stable for hopsital discharge       Goals of Care Treatment Preferences:  Code Status: Full Code      Consults:   Consults (From admission, onward)        Status Ordering Provider     Inpatient consult to General surgery  Once        Provider:  Ebenezer Andujar MD    Completed ALMAS CHURCH          No new Assessment & Plan notes have been filed under this hospital service since the last note was generated.  Service: Hospital Medicine    Final Active Diagnoses:    Diagnosis Date Noted POA    PRINCIPAL PROBLEM:  Diverticulitis of large intestine with abscess without bleeding [K57.20] 11/04/2023 Yes    Acute cystitis [N30.00] 11/04/2023 Yes      Problems Resolved During this Admission:       Discharged Condition: stable    Disposition: Home or Self Care    Follow Up:   Follow-up Information     [facility] .    Contact information:  [address]                     Patient Instructions:      Ambulatory referral/consult to Colorectal Surgery   Standing Status: Future   Referral Priority: Urgent Referral Type: Consultation   Referral Reason: Specialty Services Required Referral Location: [facility]   Requested Specialty: Colon and Rectal Surgery   Number of Visits Requested: 1     Activity as tolerated       Significant Diagnostic Studies: Labs: All labs within the past 24 hours have been reviewed    Pending  Diagnostic Studies:     None         Medications:  Reconciled Home Medications:      Medication List      START taking these medications    amoxicillin-clavulanate 875-125mg 875-125 mg per tablet  Commonly known as: AUGMENTIN  Take 1 tablet by mouth every 12 (twelve) hours. for 9 days     ondansetron 4 MG Tbdl  Commonly known as: ZOFRAN-ODT  Take 1 tablet (4 mg total) by mouth 2 (two) times daily.            Indwelling Lines/Drains at time of discharge:   Lines/Drains/Airways     None                 Time spent on the discharge of patient: 40 minutes         Eduin Funk MD  Department of Hospital Medicine  [facility]

## 2023-11-07 NOTE — NURSING
Discharge summary, health teachings and instructions given to patient with spouse at bedside--verbalized understanding. IV removed-no complications noted. Tele monitor removed and returned to the monitor room. All questions answered with regards to discharge instructions. Reminded of the importance of follow-up appointments. Instructed about awaiting the new prescribed meds from [facility] outpatient pharmacy before getting discharged-verbalized understanding. Randy armenta.

## 2023-11-07 NOTE — DISCHARGE INSTRUCTIONS
If you develop abdominal pain, fever chills please return to the hospital.      They should try to make you an appointment to 1 of the free clinics in the [facility]    You are welcome to come back and see us in the office however they will charge you for this appointment.  If you want to make an appointment with us please call the number below and ask to be scheduled    Our office phone numbers are  [phone] and       Si desarrolla dolor abdominal o escalofríos, regrese al hospital.    Deberían intentar concertarle lashay oliver en 1 de las clínicas gratuitas del área [facility].    Le invitamos a regresar y vernos en la oficina; sin embargo, le cobrarán por esta oliver. Si desea programar lashay oliver con nosotros, llame al número que aparece a continuación y solicite que la programemos.    Los teléfonos de nuestras oficinas son  y

## 2023-11-07 NOTE — SUBJECTIVE & OBJECTIVE
Interval History: Pain minimal to none, tolerating regular diet without nausea and vomiting. Stable for discharge on oral antibiotics and with outpatient surgical follow-up to plan colonoscopy in 6 weeks     Medications:  Continuous Infusions:   dextrose 5 % and 0.45 % NaCl 125 mL/hr at 11/07/23 0701     Scheduled Meds:   amoxicillin-clavulanate 875-125mg  1 tablet Oral Q12H    potassium chloride  40 mEq Oral Once     PRN Meds:acetaminophen, morphine, morphine, ondansetron, sodium chloride 0.9%     Review of patient's allergies indicates:  No Known Allergies  Objective:     Vital Signs (Most Recent):  Temp: 97.8 °F (36.6 °C) (11/07/23 0740)  Pulse: 73 (11/07/23 0740)  Resp: 19 (11/07/23 0740)  BP: 101/66 (11/07/23 0740)  SpO2: 97 % (11/07/23 0740) Vital Signs (24h Range):  Temp:  [97.6 °F (36.4 °C)-98.5 °F (36.9 °C)] 97.8 °F (36.6 °C)  Pulse:  [67-78] 73  Resp:  [16-19] 19  SpO2:  [97 %-99 %] 97 %  BP: ()/(59-79) 101/66     Weight: 76 kg (167 lb 8.8 oz)  Body mass index is 30.65 kg/m².    Intake/Output - Last 3 Shifts         11/05 0700 11/06 0659 11/06 0700 11/07 0659 11/07 0700 11/08 0659    P.O.  260     I.V. (mL/kg) 1292.2 (17) 3802.1 (50) 81.3 (1.1)    IV Piggyback 739.1 275.7     Total Intake(mL/kg) 2031.3 (26.7) 4337.8 (57.1) 81.3 (1.1)    Urine (mL/kg/hr) 1000 (0.5)      Total Output 1000      Net +1031.3 +4337.8 +81.3           Urine Occurrence 2 x 5 x              Physical Exam  Vitals and nursing note reviewed.   Constitutional:       General: She is not in acute distress.     Appearance: She is well-developed. She is not ill-appearing, toxic-appearing or diaphoretic.   HENT:      Head: Normocephalic and atraumatic.      Right Ear: External ear normal.      Left Ear: External ear normal.      Nose: Nose normal.      Mouth/Throat:      Mouth: Mucous membranes are moist.   Eyes:      Extraocular Movements: Extraocular movements intact.      Conjunctiva/sclera: Conjunctivae normal.   Cardiovascular:       Rate and Rhythm: Normal rate.   Pulmonary:      Effort: Pulmonary effort is normal. No respiratory distress.   Abdominal:      Comments: Abdomen soft, non-distended and non-tender.    Musculoskeletal:      Cervical back: Neck supple.   Skin:     General: Skin is warm and dry.   Neurological:      Mental Status: She is alert and oriented to person, place, and time.          Significant Labs:  I have reviewed all pertinent lab results within the past 24 hours.  CBC:   Recent Labs   Lab 11/07/23  0530   WBC 6.62   RBC 4.52   HGB 12.9   HCT 39.3      MCV 87   MCH 28.5   MCHC 32.8     CMP:   Recent Labs   Lab 11/07/23  0530      CALCIUM 8.6*   ALBUMIN 3.4*   PROT 7.1      K 3.4*   CO2 23      BUN 6   CREATININE 0.7   ALKPHOS 112   *   AST 71*   BILITOT 0.5       Significant Diagnostics:  I have reviewed all pertinent imaging results/findings within the past 24 hours.  No new pertinent imaging

## 2023-11-07 NOTE — PROGRESS NOTES
[facility]  General Surgery  Progress Note    Subjective:     History of Present Illness:  In the emergency room with a three-day history of left lower quadrant pain.  The pain was sharp in nature.  It was slightly better at rest but worse with movement or pushing on the area.  Was not associated with fever chills.  She reports no significant changes of bowel habits.      Patient was Irish speaking.  A  was available.      Post-Op Info:  * No surgery found *         Interval History: Pain minimal to none, tolerating regular diet without nausea and vomiting. Stable for discharge on oral antibiotics and with outpatient surgical follow-up to plan colonoscopy in 6 weeks     Medications:  Continuous Infusions:   dextrose 5 % and 0.45 % NaCl 125 mL/hr at 11/07/23 0701     Scheduled Meds:   amoxicillin-clavulanate 875-125mg  1 tablet Oral Q12H    potassium chloride  40 mEq Oral Once     PRN Meds:acetaminophen, morphine, morphine, ondansetron, sodium chloride 0.9%     Review of patient's allergies indicates:  No Known Allergies  Objective:     Vital Signs (Most Recent):  Temp: 97.8 °F (36.6 °C) (11/07/23 0740)  Pulse: 73 (11/07/23 0740)  Resp: 19 (11/07/23 0740)  BP: 101/66 (11/07/23 0740)  SpO2: 97 % (11/07/23 0740) Vital Signs (24h Range):  Temp:  [97.6 °F (36.4 °C)-98.5 °F (36.9 °C)] 97.8 °F (36.6 °C)  Pulse:  [67-78] 73  Resp:  [16-19] 19  SpO2:  [97 %-99 %] 97 %  BP: ()/(59-79) 101/66     Weight: 76 kg (167 lb 8.8 oz)  Body mass index is 30.65 kg/m².    Intake/Output - Last 3 Shifts         11/05 0700 11/06 0659 11/06 0700 11/07 0659 11/07 0700 11/08 0659    P.O.  260     I.V. (mL/kg) 1292.2 (17) 3802.1 (50) 81.3 (1.1)    IV Piggyback 739.1 275.7     Total Intake(mL/kg) 2031.3 (26.7) 4337.8 (57.1) 81.3 (1.1)    Urine (mL/kg/hr) 1000 (0.5)      Total Output 1000      Net +1031.3 +4337.8 +81.3           Urine Occurrence 2 x 5 x              Physical Exam  Vitals and nursing note  reviewed.   Constitutional:       General: She is not in acute distress.     Appearance: She is well-developed. She is not ill-appearing, toxic-appearing or diaphoretic.   HENT:      Head: Normocephalic and atraumatic.      Right Ear: External ear normal.      Left Ear: External ear normal.      Nose: Nose normal.      Mouth/Throat:      Mouth: Mucous membranes are moist.   Eyes:      Extraocular Movements: Extraocular movements intact.      Conjunctiva/sclera: Conjunctivae normal.   Cardiovascular:      Rate and Rhythm: Normal rate.   Pulmonary:      Effort: Pulmonary effort is normal. No respiratory distress.   Abdominal:      Comments: Abdomen soft, non-distended and non-tender.    Musculoskeletal:      Cervical back: Neck supple.   Skin:     General: Skin is warm and dry.   Neurological:      Mental Status: She is alert and oriented to person, place, and time.          Significant Labs:  I have reviewed all pertinent lab results within the past 24 hours.  CBC:   Recent Labs   Lab 11/07/23  0530   WBC 6.62   RBC 4.52   HGB 12.9   HCT 39.3      MCV 87   MCH 28.5   MCHC 32.8     CMP:   Recent Labs   Lab 11/07/23  0530      CALCIUM 8.6*   ALBUMIN 3.4*   PROT 7.1      K 3.4*   CO2 23      BUN 6   CREATININE 0.7   ALKPHOS 112   *   AST 71*   BILITOT 0.5       Significant Diagnostics:  I have reviewed all pertinent imaging results/findings within the past 24 hours.  No new pertinent imaging    Assessment/Plan:     * Diverticulitis of large intestine with abscess without bleeding  Acute diverticulitis with a small 1.5 cm abscess.  Pain improving.      - Continue oral antibiotics as an outpatient for 7-10 days   - patient will need outpatient follow up for colonoscopy in 6 weeks and for further management of future diverticulitis episodes should they occur. Social work consulted for assistance as patient has no insurance. Can follow-up with us if needed.       Acute cystitis  Per hospital  med        Vandana Rubi PA-C  General Surgery  [facility]

## 2023-11-07 NOTE — ASSESSMENT & PLAN NOTE
Acute diverticulitis with a small 1.5 cm abscess.  Pain improving.      - Continue oral antibiotics as an outpatient for 7-10 days   - patient will need outpatient follow up for colonoscopy in 6 weeks and for further management of future diverticulitis episodes should they occur. Social work consulted for assistance as patient has no insurance. Can follow-up with us if needed.

## 2023-11-24 ENCOUNTER — PATIENT OUTREACH (OUTPATIENT)
Dept: ADMINISTRATIVE | Facility: HOSPITAL | Age: 33
End: 2023-11-24

## 2023-11-24 NOTE — PROGRESS NOTES
Hospital Follow Up: Tried calling patient to confirm appointment for 11/27/23, no answer, not able to LVM